# Patient Record
Sex: FEMALE | Race: BLACK OR AFRICAN AMERICAN | NOT HISPANIC OR LATINO
[De-identification: names, ages, dates, MRNs, and addresses within clinical notes are randomized per-mention and may not be internally consistent; named-entity substitution may affect disease eponyms.]

---

## 2017-01-04 PROBLEM — D17.1 LIPOMA OF ANTERIOR CHEST WALL: Status: ACTIVE | Noted: 2017-01-04

## 2017-03-22 PROBLEM — M23.301 DEGENERATIVE TEAR OF LATERAL MENISCUS OF LEFT KNEE: Status: ACTIVE | Noted: 2017-03-22

## 2018-02-20 ENCOUNTER — CHARTING TRANS (OUTPATIENT)
Dept: OTHER | Age: 53
End: 2018-02-20

## 2018-02-20 ENCOUNTER — LAB SERVICES (OUTPATIENT)
Dept: OTHER | Age: 53
End: 2018-02-20

## 2018-02-25 ENCOUNTER — CHARTING TRANS (OUTPATIENT)
Dept: OTHER | Age: 53
End: 2018-02-25

## 2018-02-27 LAB — HPV I/H RISK 4 DNA CVX QL NAA+PROBE: NORMAL

## 2018-04-16 ENCOUNTER — APPOINTMENT (OUTPATIENT)
Dept: LAB | Age: 53
End: 2018-04-16
Attending: FAMILY MEDICINE
Payer: COMMERCIAL

## 2018-04-16 ENCOUNTER — TELEPHONE (OUTPATIENT)
Dept: OTHER | Age: 53
End: 2018-04-16

## 2018-04-16 ENCOUNTER — OFFICE VISIT (OUTPATIENT)
Dept: FAMILY MEDICINE CLINIC | Facility: CLINIC | Age: 53
End: 2018-04-16

## 2018-04-16 ENCOUNTER — LAB ENCOUNTER (OUTPATIENT)
Dept: LAB | Age: 53
End: 2018-04-16
Attending: FAMILY MEDICINE
Payer: COMMERCIAL

## 2018-04-16 ENCOUNTER — HOSPITAL ENCOUNTER (OUTPATIENT)
Dept: GENERAL RADIOLOGY | Age: 53
Discharge: HOME | End: 2018-04-16
Attending: FAMILY MEDICINE
Payer: COMMERCIAL

## 2018-04-16 VITALS
HEART RATE: 98 BPM | WEIGHT: 166.63 LBS | TEMPERATURE: 98 F | RESPIRATION RATE: 16 BRPM | BODY MASS INDEX: 29.16 KG/M2 | SYSTOLIC BLOOD PRESSURE: 129 MMHG | DIASTOLIC BLOOD PRESSURE: 82 MMHG | HEIGHT: 63.5 IN

## 2018-04-16 DIAGNOSIS — R05.9 COUGH: ICD-10-CM

## 2018-04-16 DIAGNOSIS — R53.83 LETHARGY: ICD-10-CM

## 2018-04-16 DIAGNOSIS — R06.00 DYSPNEA, UNSPECIFIED TYPE: ICD-10-CM

## 2018-04-16 DIAGNOSIS — R94.31 SHORTENED PR INTERVAL: ICD-10-CM

## 2018-04-16 DIAGNOSIS — R53.83 LETHARGY: Primary | ICD-10-CM

## 2018-04-16 DIAGNOSIS — R94.31 ABNORMAL EKG: Primary | ICD-10-CM

## 2018-04-16 PROCEDURE — 99204 OFFICE O/P NEW MOD 45 MIN: CPT | Performed by: FAMILY MEDICINE

## 2018-04-16 PROCEDURE — 99212 OFFICE O/P EST SF 10 MIN: CPT | Performed by: FAMILY MEDICINE

## 2018-04-16 PROCEDURE — 36415 COLL VENOUS BLD VENIPUNCTURE: CPT

## 2018-04-16 PROCEDURE — 93005 ELECTROCARDIOGRAM TRACING: CPT

## 2018-04-16 PROCEDURE — 85025 COMPLETE CBC W/AUTO DIFF WBC: CPT

## 2018-04-16 PROCEDURE — 71046 X-RAY EXAM CHEST 2 VIEWS: CPT | Performed by: FAMILY MEDICINE

## 2018-04-16 PROCEDURE — 93010 ELECTROCARDIOGRAM REPORT: CPT | Performed by: FAMILY MEDICINE

## 2018-04-16 NOTE — TELEPHONE ENCOUNTER
Pt called notified results normal.   Notes recorded by Parminder Key on 4/16/2018 at 5:01 PM CDT  Lab letter mailed to patient  ------    Notes recorded by Taran Alfonso DO on 4/16/2018 at 4:12 PM CDT  Let her know her chest x-ray is normal.  No pneum

## 2018-04-16 NOTE — TELEPHONE ENCOUNTER
Notes recorded by Dave Andrews DO on 4/16/2018 at 5:53 PM CDT  Let her know her white blood cell count was slightly elevated along with her neutrophils.  I do not know if this means inflammation or some type of infection.  Her chest x-ray was completely

## 2018-04-16 NOTE — TELEPHONE ENCOUNTER
pt was inform of your message below. Pt stated that she feels fine. She does not feel she needs to go to ER. Pt stated that after the appt she went shopping and did not have SOB nor does she feel clammy.  pt will like for you to call her.  Thanks

## 2018-04-16 NOTE — PROGRESS NOTES
Patient ID: Jacob Cooper is a 48year old female. HPI  Patient presents with:  Cough  Fatigue    She states this started slightly on 4/12/2018 but on 4/13/2018 she really started feeling more fatigued.   She states even going up the stairs caused h Medical History:   Diagnosis Date   • Abnormal Pap smear of cervix     per [employer]:  \"Abnormal pap smear\"   • Lipid screening 10-    per [employer]       Past Surgical History:  No date: CHOLECYSTECTOMY       No current outpatient prescriptions on fi looks quite good but I am worried about her complaints. I do not think this is just a bronchitis as she has not coughed once in the office and her lungs sound great. Let us go and do some lab work but  also an EKG and a chest x-ray.   She does not feel si

## 2018-04-17 RX ORDER — BENZONATATE 200 MG/1
200 CAPSULE ORAL 3 TIMES DAILY PRN
Qty: 30 CAPSULE | Refills: 0 | Status: SHIPPED | OUTPATIENT
Start: 2018-04-17 | End: 2018-04-19

## 2018-04-17 RX ORDER — AZELASTINE 1 MG/ML
2 SPRAY, METERED NASAL 2 TIMES DAILY
Qty: 1 BOTTLE | Refills: 0 | Status: SHIPPED | OUTPATIENT
Start: 2018-04-17 | End: 2018-04-19

## 2018-04-17 NOTE — TELEPHONE ENCOUNTER
Pt informed of VS' recommendations and prescriptions sent as stated below. Pt satad is actually feeling better today so states she thinks she will just give it a day or two without the prescriptions and then try them if does not continue to clear up.   Also

## 2018-04-17 NOTE — TELEPHONE ENCOUNTER
Let her know her chest x-ray was clear without bronchitis or pneumonia but this is most likely a virus coming from her head going down the back of her throat. We have been seeing quite a bit of this. She can start Astelin nasal spray.   Let her know she d

## 2018-04-17 NOTE — TELEPHONE ENCOUNTER
Dr Haroon Hood, please advise. Advised patient on Dr. Hillary Campbell information and recommendations. Patient verbalized understanding. Given Cardiology phone # and she will call cardiology.  She asked about her respiratory symptoms, what she has, especially as she

## 2018-04-17 NOTE — TELEPHONE ENCOUNTER
Let her know that I called her on her preferred phone number but it went directly to [place]. I am glad that she is feeling better.   If she actually went shopping and is not really having any complaints and I do not see that she needs to go to the emerg

## 2018-04-18 ENCOUNTER — TELEPHONE (OUTPATIENT)
Dept: OTHER | Age: 53
End: 2018-04-18

## 2018-04-18 NOTE — TELEPHONE ENCOUNTER
Patient calling and states that she is just FU of rx's were sent to her pharmacy, advised that it was sent yesterday, verbalized understanding   Medication Detail      Disp Refills Start End    Azelastine HCl 0.1 % Nasal Solution 1 Bottle 0 4/17/2018

## 2018-04-19 RX ORDER — AZELASTINE 1 MG/ML
2 SPRAY, METERED NASAL 2 TIMES DAILY
Qty: 1 BOTTLE | Refills: 0 | Status: SHIPPED | OUTPATIENT
Start: 2018-04-19 | End: 2018-12-21

## 2018-04-19 RX ORDER — BENZONATATE 200 MG/1
200 CAPSULE ORAL 3 TIMES DAILY PRN
Qty: 30 CAPSULE | Refills: 0 | Status: SHIPPED | OUTPATIENT
Start: 2018-04-19 | End: 2018-04-29

## 2018-04-19 NOTE — TELEPHONE ENCOUNTER
Pt called in to check to see where rx's were sent. Pt states that they were supposed to be sent to the [facility] in [place] (updated on encounter). Please advise.

## 2018-08-28 ENCOUNTER — TELEPHONE (OUTPATIENT)
Dept: FAMILY MEDICINE CLINIC | Facility: CLINIC | Age: 53
End: 2018-08-28

## 2018-08-28 NOTE — TELEPHONE ENCOUNTER
We usually order these tests through a physical exam.  She has not had a physical exam with us since 2013. Please set up for a physical exam we will get the preventative testing done.

## 2018-08-28 NOTE — TELEPHONE ENCOUNTER
PT stts with her previous pcp she gets [facility] yearly  Pt stts she is over due for her MAMMO since FEB  Pt asking to have an order/REFERRAL  for her yearly BI-Lateral   Please advise

## 2018-09-10 ENCOUNTER — LAB ENCOUNTER (OUTPATIENT)
Dept: LAB | Age: 53
End: 2018-09-10
Attending: FAMILY MEDICINE
Payer: COMMERCIAL

## 2018-09-10 ENCOUNTER — OFFICE VISIT (OUTPATIENT)
Dept: FAMILY MEDICINE CLINIC | Facility: CLINIC | Age: 53
End: 2018-09-10

## 2018-09-10 VITALS
WEIGHT: 163 LBS | HEART RATE: 82 BPM | SYSTOLIC BLOOD PRESSURE: 121 MMHG | HEIGHT: 63.5 IN | BODY MASS INDEX: 28.53 KG/M2 | TEMPERATURE: 98 F | DIASTOLIC BLOOD PRESSURE: 80 MMHG

## 2018-09-10 DIAGNOSIS — L91.8 SKIN TAG: ICD-10-CM

## 2018-09-10 DIAGNOSIS — Z00.00 ADULT GENERAL MEDICAL EXAM: Primary | ICD-10-CM

## 2018-09-10 DIAGNOSIS — L57.8 SUN-DAMAGED SKIN: ICD-10-CM

## 2018-09-10 DIAGNOSIS — Z23 NEED FOR VACCINATION: ICD-10-CM

## 2018-09-10 DIAGNOSIS — Z12.4 CERVICAL CANCER SCREENING: ICD-10-CM

## 2018-09-10 DIAGNOSIS — Z12.31 VISIT FOR SCREENING MAMMOGRAM: ICD-10-CM

## 2018-09-10 DIAGNOSIS — Z12.11 SCREENING FOR COLON CANCER: ICD-10-CM

## 2018-09-10 DIAGNOSIS — L84 CALLUS OF FOOT: ICD-10-CM

## 2018-09-10 DIAGNOSIS — Z00.00 ADULT GENERAL MEDICAL EXAM: ICD-10-CM

## 2018-09-10 LAB
ALBUMIN SERPL BCP-MCNC: 4.2 G/DL (ref 3.5–4.8)
ALBUMIN/GLOB SERPL: 1.6 {RATIO} (ref 1–2)
ALP SERPL-CCNC: 55 U/L (ref 32–100)
ALT SERPL-CCNC: 23 U/L (ref 14–54)
ANION GAP SERPL CALC-SCNC: 6 MMOL/L (ref 0–18)
AST SERPL-CCNC: 20 U/L (ref 15–41)
BASOPHILS # BLD: 0.1 K/UL (ref 0–0.2)
BASOPHILS NFR BLD: 1 %
BILIRUB SERPL-MCNC: 1.3 MG/DL (ref 0.3–1.2)
BUN SERPL-MCNC: 9 MG/DL (ref 8–20)
BUN/CREAT SERPL: 13.2 (ref 10–20)
CALCIUM SERPL-MCNC: 9.3 MG/DL (ref 8.5–10.5)
CHLORIDE SERPL-SCNC: 104 MMOL/L (ref 95–110)
CHOLEST SERPL-MCNC: 248 MG/DL (ref 110–200)
CO2 SERPL-SCNC: 28 MMOL/L (ref 22–32)
CREAT SERPL-MCNC: 0.68 MG/DL (ref 0.5–1.5)
EOSINOPHIL # BLD: 0.2 K/UL (ref 0–0.7)
EOSINOPHIL NFR BLD: 2 %
ERYTHROCYTE [DISTWIDTH] IN BLOOD BY AUTOMATED COUNT: 12.6 % (ref 11–15)
GLOBULIN PLAS-MCNC: 2.7 G/DL (ref 2.5–3.7)
GLUCOSE SERPL-MCNC: 105 MG/DL (ref 70–99)
HCT VFR BLD AUTO: 41.1 % (ref 35–48)
HDLC SERPL-MCNC: 42 MG/DL
HGB BLD-MCNC: 13.8 G/DL (ref 12–16)
LDLC SERPL CALC-MCNC: 173 MG/DL (ref 0–99)
LYMPHOCYTES # BLD: 1.9 K/UL (ref 1–4)
LYMPHOCYTES NFR BLD: 30 %
MCH RBC QN AUTO: 32 PG (ref 27–32)
MCHC RBC AUTO-ENTMCNC: 33.7 G/DL (ref 32–37)
MCV RBC AUTO: 95 FL (ref 80–100)
MONOCYTES # BLD: 0.5 K/UL (ref 0–1)
MONOCYTES NFR BLD: 8 %
NEUTROPHILS # BLD AUTO: 3.8 K/UL (ref 1.8–7.7)
NEUTROPHILS NFR BLD: 59 %
NONHDLC SERPL-MCNC: 206 MG/DL
OSMOLALITY UR CALC.SUM OF ELEC: 285 MOSM/KG (ref 275–295)
PATIENT FASTING: YES
PLATELET # BLD AUTO: 340 K/UL (ref 140–400)
PMV BLD AUTO: 8.5 FL (ref 7.4–10.3)
POTASSIUM SERPL-SCNC: 4.5 MMOL/L (ref 3.3–5.1)
PROT SERPL-MCNC: 6.9 G/DL (ref 5.9–8.4)
RBC # BLD AUTO: 4.33 M/UL (ref 3.7–5.4)
SODIUM SERPL-SCNC: 138 MMOL/L (ref 136–144)
TRIGL SERPL-MCNC: 164 MG/DL (ref 1–149)
TSH SERPL-ACNC: 0.59 UIU/ML (ref 0.45–5.33)
WBC # BLD AUTO: 6.4 K/UL (ref 4–11)

## 2018-09-10 PROCEDURE — 82306 VITAMIN D 25 HYDROXY: CPT

## 2018-09-10 PROCEDURE — 84443 ASSAY THYROID STIM HORMONE: CPT

## 2018-09-10 PROCEDURE — 90471 IMMUNIZATION ADMIN: CPT | Performed by: FAMILY MEDICINE

## 2018-09-10 PROCEDURE — 36415 COLL VENOUS BLD VENIPUNCTURE: CPT

## 2018-09-10 PROCEDURE — 85025 COMPLETE CBC W/AUTO DIFF WBC: CPT

## 2018-09-10 PROCEDURE — 80053 COMPREHEN METABOLIC PANEL: CPT

## 2018-09-10 PROCEDURE — 80061 LIPID PANEL: CPT

## 2018-09-10 PROCEDURE — 99396 PREV VISIT EST AGE 40-64: CPT | Performed by: FAMILY MEDICINE

## 2018-09-10 PROCEDURE — 90715 TDAP VACCINE 7 YRS/> IM: CPT | Performed by: FAMILY MEDICINE

## 2018-09-10 NOTE — PROGRESS NOTES
Patient ID: Apurva Hsu is a 48year old female. HPI  Patient presents with:  Physical      She had a Pap smear in March of this year but she would like a gynecologist that is at our [address] [place] [address] office. She has had a mammogram but she is due.   She has 25 10/18/2013       Lab Results   Component Value Date    GLU 96 10/18/2013    BUN 10 10/18/2013    CREATSERUM 0.62 10/18/2013    BUNCREA 16.1 10/18/2013    ANIONGAP 10 10/18/2013    GFRAA > 60 10/18/2013    GFRNAA > 60 10/18/2013    CA 9.1 10/18/2013    N 118/80        Review of Systems   Constitutional: Negative for fatigue, fever and unexpected weight change. HENT: Negative for facial swelling and trouble swallowing. Eyes: Negative for visual disturbance.    Respiratory: Negative for cough and shortne Other Topics      Concerns:         Service: Not Asked        Blood Transfusions: Not Asked        Caffeine Concern: Yes          Coffee        Occupational Exposure: Not Asked        Hobby Hazards: Not Asked        Sleep Concern: Not Asked lateral foot at the fifth metatarsophalangeal joint she does have a callus the size of a dime. No Pain. Psychiatric: She has a normal mood and affect   Lower legs: No edema of the legs bilaterally. Vitals reviewed.     Blood pressure 121/80, pulse 82, if you are candidate to get the colonoscopy done at the hospital without having to see a physician first.          Referral Priority:Routine          Referral Type:Procedure          Number of Visits Requested:1        Follow up if symptoms persist.  Take

## 2018-09-12 LAB — 25(OH)D3 SERPL-MCNC: 37.4 NG/ML

## 2018-09-18 ENCOUNTER — HOSPITAL ENCOUNTER (OUTPATIENT)
Dept: MAMMOGRAPHY | Age: 53
Discharge: HOME | End: 2018-09-18
Attending: FAMILY MEDICINE
Payer: COMMERCIAL

## 2018-09-18 DIAGNOSIS — Z12.31 VISIT FOR SCREENING MAMMOGRAM: ICD-10-CM

## 2018-09-18 PROCEDURE — 77067 SCR MAMMO BI INCL CAD: CPT | Performed by: FAMILY MEDICINE

## 2018-10-03 PROCEDURE — 81003 URINALYSIS AUTO W/O SCOPE: CPT | Performed by: INTERNAL MEDICINE

## 2018-10-24 ENCOUNTER — OFFICE VISIT (OUTPATIENT)
Dept: DERMATOLOGY CLINIC | Facility: CLINIC | Age: 53
End: 2018-10-24

## 2018-10-24 DIAGNOSIS — D23.4 BENIGN NEOPLASM OF SCALP AND SKIN OF NECK: ICD-10-CM

## 2018-10-24 DIAGNOSIS — L82.1 SEBORRHEIC KERATOSES: ICD-10-CM

## 2018-10-24 DIAGNOSIS — D23.30 BENIGN NEOPLASM OF SKIN OF FACE: ICD-10-CM

## 2018-10-24 DIAGNOSIS — D23.5 BENIGN NEOPLASM OF SKIN OF TRUNK, EXCEPT SCROTUM: ICD-10-CM

## 2018-10-24 DIAGNOSIS — D22.9 MULTIPLE NEVI: Primary | ICD-10-CM

## 2018-10-24 DIAGNOSIS — D23.70 BENIGN NEOPLASM OF SKIN OF LOWER LIMB, INCLUDING HIP, UNSPECIFIED LATERALITY: ICD-10-CM

## 2018-10-24 DIAGNOSIS — D23.60 BENIGN NEOPLASM OF SKIN OF UPPER LIMB, INCLUDING SHOULDER, UNSPECIFIED LATERALITY: ICD-10-CM

## 2018-10-24 PROCEDURE — 99203 OFFICE O/P NEW LOW 30 MIN: CPT | Performed by: DERMATOLOGY

## 2018-10-24 PROCEDURE — 99212 OFFICE O/P EST SF 10 MIN: CPT | Performed by: DERMATOLOGY

## 2018-10-24 NOTE — PROGRESS NOTES
Past Medical History:   Diagnosis Date   • Abnormal Pap smear of cervix     per [employer]:  \"Abnormal pap smear\"   • Lipid screening 10-    per [employer]     Past Surgical History:   Procedure Laterality Date   • CHOLECYSTECTOMY       Social History

## 2018-11-01 VITALS
DIASTOLIC BLOOD PRESSURE: 70 MMHG | SYSTOLIC BLOOD PRESSURE: 120 MMHG | HEIGHT: 65 IN | WEIGHT: 169 LBS | BODY MASS INDEX: 28.16 KG/M2

## 2018-11-04 NOTE — PROGRESS NOTES
Asberry Merlin is a 48year old female. HPI:     CC:  Patient presents with:  Full Skin Exam: New pt. Pt presents for a full skin exam. Pt denies personal and family hx of skin cancer. Allergies:  Patient has no known allergies.     HISTORY: Topics      Concerns:         Service: Not Asked        Blood Transfusions: Not Asked        Caffeine Concern: Yes          Coffee        Occupational Exposure: Not Asked        Hobby Hazards: Not Asked        Sleep Concern: Not Asked        Stress papules scattered. See map today's date for lesions noted . Otherwise remarkable for lesions as noted on map.   See details of examination  See Assessment /Plan for additional history and physical exam also:    Assessment / plan:    No orders of the noted in follow-up/ above. This note was generated using Dragon voice recognition software. Please contact me regarding any confusion resulting from errors in recognition.

## 2018-12-07 ENCOUNTER — TELEPHONE (OUTPATIENT)
Dept: FAMILY MEDICINE CLINIC | Facility: CLINIC | Age: 53
End: 2018-12-07

## 2018-12-07 DIAGNOSIS — M21.619 BUNION OF UNSPECIFIED FOOT: Primary | ICD-10-CM

## 2018-12-07 NOTE — TELEPHONE ENCOUNTER
Dr. Alison Swift     Patient called requesting a referral for Podiatry dept. Per patient is have pain on left foot possible bunion. Please advise and sign off on referral if you agree.       Thank you, Dot Blanca Small-Referral Specialist.

## 2018-12-21 ENCOUNTER — OFFICE VISIT (OUTPATIENT)
Dept: PODIATRY CLINIC | Facility: CLINIC | Age: 53
End: 2018-12-21

## 2018-12-21 DIAGNOSIS — L85.1 PLANTAR POROKERATOSIS, ACQUIRED: ICD-10-CM

## 2018-12-21 DIAGNOSIS — M77.42 METATARSALGIA OF LEFT FOOT: ICD-10-CM

## 2018-12-21 DIAGNOSIS — M79.672 PAIN IN LEFT FOOT: ICD-10-CM

## 2018-12-21 DIAGNOSIS — G57.61 NEUROMA OF SECOND INTERSPACE OF RIGHT FOOT: ICD-10-CM

## 2018-12-21 DIAGNOSIS — L84 CALLUS OF FOOT: Primary | ICD-10-CM

## 2018-12-21 PROCEDURE — 99203 OFFICE O/P NEW LOW 30 MIN: CPT | Performed by: PODIATRIST

## 2018-12-26 NOTE — PROGRESS NOTES
Tiara Ron is a 48year old female. Patient presents with:  Consult: left foot lesion -- Onset few mths. Pain level is 5-6/10 and comes and goes.          HPI:   This pleasant patient presents to the clinic with a chief complaint of a painful callus no apparent distress  EXTREMITIES:   1. Integument: The skin on both feet was examined it is warm and dry. Nails have relatively normal thickness.   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

## 2019-06-03 ENCOUNTER — TELEPHONE (OUTPATIENT)
Dept: CASE MANAGEMENT | Age: 54
End: 2019-06-03

## 2019-07-31 ENCOUNTER — TELEPHONE (OUTPATIENT)
Dept: CASE MANAGEMENT | Age: 54
End: 2019-07-31

## 2019-11-11 ENCOUNTER — TELEPHONE (OUTPATIENT)
Dept: CASE MANAGEMENT | Age: 54
End: 2019-11-11

## 2019-11-22 ENCOUNTER — OFFICE VISIT (OUTPATIENT)
Dept: FAMILY MEDICINE CLINIC | Facility: CLINIC | Age: 54
End: 2019-11-22

## 2019-11-22 VITALS
SYSTOLIC BLOOD PRESSURE: 125 MMHG | HEIGHT: 63.5 IN | BODY MASS INDEX: 30.1 KG/M2 | HEART RATE: 88 BPM | WEIGHT: 172 LBS | DIASTOLIC BLOOD PRESSURE: 75 MMHG | TEMPERATURE: 98 F

## 2019-11-22 DIAGNOSIS — R73.9 HYPERGLYCEMIA: ICD-10-CM

## 2019-11-22 DIAGNOSIS — Z12.4 CERVICAL CANCER SCREENING: ICD-10-CM

## 2019-11-22 DIAGNOSIS — Z12.11 SCREENING FOR COLON CANCER: ICD-10-CM

## 2019-11-22 DIAGNOSIS — Z28.21 IMMUNIZATION NOT CARRIED OUT BECAUSE OF PATIENT REFUSAL: ICD-10-CM

## 2019-11-22 DIAGNOSIS — Z12.31 VISIT FOR SCREENING MAMMOGRAM: ICD-10-CM

## 2019-11-22 DIAGNOSIS — Z00.00 ADULT GENERAL MEDICAL EXAM: Primary | ICD-10-CM

## 2019-11-22 PROCEDURE — 99396 PREV VISIT EST AGE 40-64: CPT | Performed by: FAMILY MEDICINE

## 2019-11-22 NOTE — PROGRESS NOTES
Patient ID: Jerel Men is a 47year old female. HPI  Patient presents with: Annual    Pt is  and does not smoke. 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 She also works at a [employer].  States life has been kind to 10/18/2013    EOS 0.1 10/18/2013    BASO 0.1 10/18/2013    NEPERCENT 59 09/10/2018    LYPERCENT 30 09/10/2018    MOPERCENT 8 09/10/2018    EOPERCENT 2 09/10/2018    BAPERCENT 1 09/10/2018    NE 3.8 09/10/2018    LYMABS 1.9 09/10/2018    MOABSO 0.5 09/10/20 27.20 kg/m²      BP Readings from Last 6 Encounters:  11/22/19 : 125/75  09/10/18 : 121/80  04/16/18 : 129/82  03/09/15 : 120/75        Review of Systems   Constitutional: Negative for chills and fever. HENT: Negative for voice change.     Respiratory: Ne Active member of club or organization: Not on file        Attends meetings of clubs or organizations: Not on file        Relationship status: Not on file      Intimate partner violence:        Fear of current or ex partner: Not on file        Emotionally a Lymphadenopathy: She has no cervical adenopathy. Lower legs: No edema of the legs bilaterally. Neurological: She is alert and oriented to person, place, and time. She has normal reflexes. No cranial nerve deficit. Skin: Skin is warm and dry.  No sloan Referral Priority:Routine          Referral Type:OFFICE VISIT          Referred to Provider:Bibiana Baca PA-C          Requested Specialty:GASTROENTEROLOGY          Number of Visits Requested:3        Jinny Antwan  11/22/2019      By signing my name

## 2019-12-02 ENCOUNTER — LAB ENCOUNTER (OUTPATIENT)
Dept: LAB | Age: 54
End: 2019-12-02
Attending: FAMILY MEDICINE
Payer: COMMERCIAL

## 2019-12-02 DIAGNOSIS — Z00.00 ADULT GENERAL MEDICAL EXAM: ICD-10-CM

## 2019-12-02 DIAGNOSIS — R73.9 HYPERGLYCEMIA: ICD-10-CM

## 2019-12-02 PROCEDURE — 83036 HEMOGLOBIN GLYCOSYLATED A1C: CPT

## 2019-12-02 PROCEDURE — 80061 LIPID PANEL: CPT

## 2019-12-02 PROCEDURE — 84443 ASSAY THYROID STIM HORMONE: CPT

## 2019-12-02 PROCEDURE — 85025 COMPLETE CBC W/AUTO DIFF WBC: CPT

## 2019-12-02 PROCEDURE — 80053 COMPREHEN METABOLIC PANEL: CPT

## 2019-12-02 PROCEDURE — 36415 COLL VENOUS BLD VENIPUNCTURE: CPT

## 2020-01-03 ENCOUNTER — TELEPHONE (OUTPATIENT)
Dept: FAMILY MEDICINE CLINIC | Facility: CLINIC | Age: 55
End: 2020-01-03

## 2020-01-03 ENCOUNTER — HOSPITAL ENCOUNTER (OUTPATIENT)
Dept: MAMMOGRAPHY | Age: 55
Discharge: HOME | End: 2020-01-03
Attending: FAMILY MEDICINE
Payer: COMMERCIAL

## 2020-01-03 DIAGNOSIS — Z12.31 VISIT FOR SCREENING MAMMOGRAM: ICD-10-CM

## 2020-01-03 PROCEDURE — 77063 BREAST TOMOSYNTHESIS BI: CPT | Performed by: FAMILY MEDICINE

## 2020-01-03 PROCEDURE — 77067 SCR MAMMO BI INCL CAD: CPT | Performed by: FAMILY MEDICINE

## 2020-02-07 ENCOUNTER — OFFICE VISIT (OUTPATIENT)
Dept: FAMILY MEDICINE CLINIC | Facility: CLINIC | Age: 55
End: 2020-02-07

## 2020-02-07 VITALS
HEART RATE: 84 BPM | BODY MASS INDEX: 30.1 KG/M2 | DIASTOLIC BLOOD PRESSURE: 71 MMHG | TEMPERATURE: 98 F | SYSTOLIC BLOOD PRESSURE: 118 MMHG | WEIGHT: 172 LBS | HEIGHT: 63.5 IN

## 2020-02-07 DIAGNOSIS — B00.9 HERPES SIMPLEX INFECTION: Primary | ICD-10-CM

## 2020-02-07 PROCEDURE — 99213 OFFICE O/P EST LOW 20 MIN: CPT | Performed by: FAMILY MEDICINE

## 2020-02-07 NOTE — PROGRESS NOTES
Patient ID: Eleanor Hull is a 47year old female. HPI  Patient presents with:  Rash: back, Started Sun     Pt noticed a rash on her back on Sunday 2/2/20. It is not very itchy unless she rubs it against something. It is not painful.  She has a single CHOLECYSTECTOMY            No current outpatient medications on file. Allergies:No Known Allergies   PHYSICAL EXAM:   Physical Exam   Physical Exam   Constitutional: Patient is oriented to person, place, and time.  Patient appears well-developed and wel

## 2020-02-19 ENCOUNTER — OFFICE VISIT (OUTPATIENT)
Dept: OBGYN CLINIC | Facility: CLINIC | Age: 55
End: 2020-02-19

## 2020-02-19 VITALS
SYSTOLIC BLOOD PRESSURE: 124 MMHG | BODY MASS INDEX: 30 KG/M2 | WEIGHT: 171.81 LBS | DIASTOLIC BLOOD PRESSURE: 85 MMHG | HEART RATE: 90 BPM

## 2020-02-19 DIAGNOSIS — Z12.4 CERVICAL CANCER SCREENING: ICD-10-CM

## 2020-02-19 DIAGNOSIS — Z01.419 ENCOUNTER FOR GYNECOLOGICAL EXAMINATION WITHOUT ABNORMAL FINDING: Primary | ICD-10-CM

## 2020-02-19 PROCEDURE — G0438 PPPS, INITIAL VISIT: HCPCS | Performed by: OBSTETRICS & GYNECOLOGY

## 2020-02-19 PROCEDURE — 99386 PREV VISIT NEW AGE 40-64: CPT | Performed by: OBSTETRICS & GYNECOLOGY

## 2020-02-19 NOTE — PROGRESS NOTES
Well Woman Exam    HPI:  The patient is a 48yo female who presents today for annual exam. She has no complaints. She has gone 5 months without a menses then had normal menses Dec and Jan. She does have night sweats and hot flushes but manageable.  No abnorm meetings of clubs or organizations: Not on file        Relationship status: Not on file      Intimate partner violence:        Fear of current or ex partner: Not on file        Emotionally abused: Not on file        Physically abused: Not on file        Fo adenopathy  Heart: Regular rate and rhythm   Lungs: clear to ascultation bilaterally   Lymphatic:no abnormal supraclavicular or axillary adenopathy is noted  Breast: normal without palpable masses, tenderness, asymmetry, nipple discharge, nipple retraction

## 2020-02-20 LAB — HPV I/H RISK 1 DNA SPEC QL NAA+PROBE: NEGATIVE

## 2020-07-23 ENCOUNTER — OFFICE VISIT (OUTPATIENT)
Dept: FAMILY MEDICINE CLINIC | Facility: CLINIC | Age: 55
End: 2020-07-23

## 2020-07-23 ENCOUNTER — HOSPITAL ENCOUNTER (OUTPATIENT)
Dept: GENERAL RADIOLOGY | Age: 55
Discharge: HOME | End: 2020-07-23
Attending: FAMILY MEDICINE
Payer: COMMERCIAL

## 2020-07-23 VITALS
HEIGHT: 63.5 IN | HEART RATE: 88 BPM | BODY MASS INDEX: 30.31 KG/M2 | DIASTOLIC BLOOD PRESSURE: 69 MMHG | WEIGHT: 173.19 LBS | SYSTOLIC BLOOD PRESSURE: 104 MMHG | TEMPERATURE: 98 F

## 2020-07-23 DIAGNOSIS — M25.511 CHRONIC RIGHT SHOULDER PAIN: Primary | ICD-10-CM

## 2020-07-23 DIAGNOSIS — M25.511 CHRONIC RIGHT SHOULDER PAIN: ICD-10-CM

## 2020-07-23 DIAGNOSIS — G89.29 CHRONIC RIGHT SHOULDER PAIN: Primary | ICD-10-CM

## 2020-07-23 DIAGNOSIS — M25.611 DECREASED RANGE OF MOTION OF RIGHT SHOULDER: ICD-10-CM

## 2020-07-23 DIAGNOSIS — Z12.11 SCREENING FOR COLON CANCER: ICD-10-CM

## 2020-07-23 DIAGNOSIS — G89.29 CHRONIC RIGHT SHOULDER PAIN: ICD-10-CM

## 2020-07-23 PROCEDURE — 3008F BODY MASS INDEX DOCD: CPT | Performed by: FAMILY MEDICINE

## 2020-07-23 PROCEDURE — 73030 X-RAY EXAM OF SHOULDER: CPT | Performed by: FAMILY MEDICINE

## 2020-07-23 PROCEDURE — 3078F DIAST BP <80 MM HG: CPT | Performed by: FAMILY MEDICINE

## 2020-07-23 PROCEDURE — 3074F SYST BP LT 130 MM HG: CPT | Performed by: FAMILY MEDICINE

## 2020-07-23 PROCEDURE — 99214 OFFICE O/P EST MOD 30 MIN: CPT | Performed by: FAMILY MEDICINE

## 2020-07-23 NOTE — PROGRESS NOTES
Patient ID: Baron Ochoa is a 54year old female. HPI  Patient presents with:  Shoulder Pain: right shoulder pain       Pt had Hx of left shoulder pain about 7 years ago.  She has been experiencing right shoulder pain for about 8 - 9 months and notic patient is not nervous/anxious.             Medical History:      Past Medical History:   Diagnosis Date   • Abnormal Pap smear of cervix     per [employer]:  \"Abnormal pap smear\"   • Lipid screening 10-    per NextGen       Past Surgical History:   P muscles to help her with internal rotation. I think physical therapy would be helpful. X-ray the right shoulder. Decreased range of motion of right shoulder  -     XR SHOULDER, COMPLETE (MIN 2 VIEWS), RIGHT (CPT=73030);  Future  -     PHYSICAL THERAPY -

## 2020-07-23 NOTE — PATIENT INSTRUCTIONS
If physical therapy makes her shoulder much better you do not need to see me. If you are not feeling much better after 6 to 8 weeks then please see me.   Otherwise see me after November 22, 2020 for your physical exam.

## 2020-08-12 ENCOUNTER — TELEPHONE (OUTPATIENT)
Dept: PHYSICAL THERAPY | Age: 55
End: 2020-08-12

## 2020-08-17 ENCOUNTER — OFFICE VISIT (OUTPATIENT)
Dept: PHYSICAL THERAPY | Age: 55
End: 2020-08-17
Attending: FAMILY MEDICINE
Payer: COMMERCIAL

## 2020-08-17 DIAGNOSIS — M25.611 DECREASED RANGE OF MOTION OF RIGHT SHOULDER: ICD-10-CM

## 2020-08-17 DIAGNOSIS — G89.29 CHRONIC RIGHT SHOULDER PAIN: ICD-10-CM

## 2020-08-17 DIAGNOSIS — M25.511 CHRONIC RIGHT SHOULDER PAIN: ICD-10-CM

## 2020-08-17 PROCEDURE — 97110 THERAPEUTIC EXERCISES: CPT | Performed by: PHYSICAL THERAPIST

## 2020-08-17 PROCEDURE — 97161 PT EVAL LOW COMPLEX 20 MIN: CPT | Performed by: PHYSICAL THERAPIST

## 2020-08-17 NOTE — PROGRESS NOTES
P.T. EVALUATION:   Referring Physician: Dr. Cornelio Boyer  Diagnosis: Chronic right shoulder pain (M25.511,G89.29)  Decreased range of motion of right shoulder (M25.611)    Date of Onset: 7/23/2020 Date of Service: 8/17/2020     PATIENT SUMMARY   Patient Efren Ortega rest of R UE. Special tests: (-) drop arm R shoulder, (-) impingement sign    Posture: WFL    Balance: WFL    Gait: WNL without a device.     Today’s Treatment and Response:  Patient education provided on PT eval findings, treatment plan, goals,  Patient

## 2020-08-20 ENCOUNTER — OFFICE VISIT (OUTPATIENT)
Dept: PHYSICAL THERAPY | Age: 55
End: 2020-08-20
Attending: FAMILY MEDICINE
Payer: COMMERCIAL

## 2020-08-20 DIAGNOSIS — M25.611 DECREASED RANGE OF MOTION OF RIGHT SHOULDER: ICD-10-CM

## 2020-08-20 DIAGNOSIS — G89.29 CHRONIC RIGHT SHOULDER PAIN: ICD-10-CM

## 2020-08-20 DIAGNOSIS — M25.511 CHRONIC RIGHT SHOULDER PAIN: ICD-10-CM

## 2020-08-20 PROCEDURE — 97110 THERAPEUTIC EXERCISES: CPT | Performed by: PHYSICAL THERAPIST

## 2020-08-20 NOTE — PROGRESS NOTES
Diagnosis: Chronic right shoulder pain (M25.511,G89.29)  Decreased range of motion of right shoulder (M25.611)        Next MD visit: none scheduled  Fall Risk: standard         Precautions: n/a          Medication Changes since last visit?: No      Fatimah Chute

## 2020-08-24 ENCOUNTER — APPOINTMENT (OUTPATIENT)
Dept: PHYSICAL THERAPY | Age: 55
End: 2020-08-24
Attending: FAMILY MEDICINE
Payer: COMMERCIAL

## 2020-08-27 ENCOUNTER — APPOINTMENT (OUTPATIENT)
Dept: PHYSICAL THERAPY | Age: 55
End: 2020-08-27
Attending: FAMILY MEDICINE
Payer: COMMERCIAL

## 2020-09-03 ENCOUNTER — OFFICE VISIT (OUTPATIENT)
Dept: PHYSICAL THERAPY | Age: 55
End: 2020-09-03
Attending: FAMILY MEDICINE
Payer: COMMERCIAL

## 2020-09-03 DIAGNOSIS — M25.611 DECREASED RANGE OF MOTION OF RIGHT SHOULDER: ICD-10-CM

## 2020-09-03 DIAGNOSIS — M25.511 CHRONIC RIGHT SHOULDER PAIN: ICD-10-CM

## 2020-09-03 DIAGNOSIS — G89.29 CHRONIC RIGHT SHOULDER PAIN: ICD-10-CM

## 2020-09-03 PROCEDURE — 97110 THERAPEUTIC EXERCISES: CPT | Performed by: PHYSICAL THERAPIST

## 2020-09-03 NOTE — PROGRESS NOTES
Diagnosis: Chronic right shoulder pain (M25.511,G89.29)  Decreased range of motion of right shoulder (M25.611)        Next MD visit: none scheduled  Fall Risk: standard         Precautions: n/a          Medication Changes since last visit?: No      Thu Pritchett 1/10 during activity. 3. Increase R shoulder ROM to WNL into all planes to improve mobility. 4. Increase R UE strength to 5/5 throughout to be able to resume previous activities without difficulty. Plan: Continue PT.       Charges: EX3

## 2020-09-10 ENCOUNTER — APPOINTMENT (OUTPATIENT)
Dept: PHYSICAL THERAPY | Age: 55
End: 2020-09-10
Attending: FAMILY MEDICINE
Payer: COMMERCIAL

## 2020-09-14 ENCOUNTER — OFFICE VISIT (OUTPATIENT)
Dept: PHYSICAL THERAPY | Age: 55
End: 2020-09-14
Attending: FAMILY MEDICINE
Payer: COMMERCIAL

## 2020-09-14 DIAGNOSIS — M25.611 DECREASED RANGE OF MOTION OF RIGHT SHOULDER: ICD-10-CM

## 2020-09-14 DIAGNOSIS — M25.511 CHRONIC RIGHT SHOULDER PAIN: ICD-10-CM

## 2020-09-14 DIAGNOSIS — G89.29 CHRONIC RIGHT SHOULDER PAIN: ICD-10-CM

## 2020-09-14 PROCEDURE — 97110 THERAPEUTIC EXERCISES: CPT | Performed by: PHYSICAL THERAPIST

## 2020-09-14 NOTE — PROGRESS NOTES
Diagnosis: Chronic right shoulder pain (M25.511,G89.29)  Decreased range of motion of right shoulder (M25.611)        Next MD visit: none scheduled  Fall Risk: standard         Precautions: n/a          Medication Changes since last visit?: No      Ama Shah x 2  - sidelying R shoulder scaption painfree range only 10 x 2  - behind the back reach 10x  - B shoulder rows, extensions with green theraband 10 x 2  -reviewed HEP.  Instructed on additional HEP with green theraband 10 x 2                             Ass

## 2020-09-17 ENCOUNTER — OFFICE VISIT (OUTPATIENT)
Dept: PHYSICAL THERAPY | Age: 55
End: 2020-09-17
Attending: FAMILY MEDICINE
Payer: COMMERCIAL

## 2020-09-17 DIAGNOSIS — M25.611 DECREASED RANGE OF MOTION OF RIGHT SHOULDER: ICD-10-CM

## 2020-09-17 DIAGNOSIS — M25.511 CHRONIC RIGHT SHOULDER PAIN: ICD-10-CM

## 2020-09-17 DIAGNOSIS — G89.29 CHRONIC RIGHT SHOULDER PAIN: ICD-10-CM

## 2020-09-17 PROCEDURE — 97110 THERAPEUTIC EXERCISES: CPT | Performed by: PHYSICAL THERAPIST

## 2020-09-17 NOTE — PROGRESS NOTES
Diagnosis: Chronic right shoulder pain (M25.511,G89.29)  Decreased range of motion of right shoulder (M25.611)        Next MD visit: none scheduled  Fall Risk: standard         Precautions: n/a          Medication Changes since last visit?: Larisa Farmer 2  - sidelying R shoulder scaption with 1lb wt 10 x 2  - prone R shoulder single arm row, extensions with 2lb wt 10 x 2  - R shoulder single arm row 20# 10 x 2  - R shoulder single extensions row 20# 10 x 2  - R shoulder internal and external rotation with

## 2020-09-21 ENCOUNTER — OFFICE VISIT (OUTPATIENT)
Dept: PHYSICAL THERAPY | Age: 55
End: 2020-09-21
Attending: FAMILY MEDICINE
Payer: COMMERCIAL

## 2020-09-21 DIAGNOSIS — G89.29 CHRONIC RIGHT SHOULDER PAIN: ICD-10-CM

## 2020-09-21 DIAGNOSIS — M25.511 CHRONIC RIGHT SHOULDER PAIN: ICD-10-CM

## 2020-09-21 DIAGNOSIS — M25.611 DECREASED RANGE OF MOTION OF RIGHT SHOULDER: ICD-10-CM

## 2020-09-21 PROCEDURE — 97110 THERAPEUTIC EXERCISES: CPT | Performed by: PHYSICAL THERAPIST

## 2020-09-21 NOTE — PROGRESS NOTES
Diagnosis: Chronic right shoulder pain (M25.511,G89.29)  Decreased range of motion of right shoulder (M25.611)        Next MD visit: none scheduled  Fall Risk: standard         Precautions: n/a          Medication Changes since last visit?: Larisa Coley 2  - R shoulder single cable extensions 10# 10 x 2  - R shoulder single external and internal rotation 0# 10 x 2                         Assessment: Continued progression of therapeutic exercises. Patient and PT goals are in progress.     Goals     • Therap

## 2020-09-24 ENCOUNTER — APPOINTMENT (OUTPATIENT)
Dept: PHYSICAL THERAPY | Age: 55
End: 2020-09-24
Attending: FAMILY MEDICINE
Payer: COMMERCIAL

## 2020-09-28 ENCOUNTER — TELEPHONE (OUTPATIENT)
Dept: PHYSICAL THERAPY | Age: 55
End: 2020-09-28

## 2020-10-01 ENCOUNTER — APPOINTMENT (OUTPATIENT)
Dept: PHYSICAL THERAPY | Age: 55
End: 2020-10-01
Attending: FAMILY MEDICINE
Payer: COMMERCIAL

## 2020-10-08 ENCOUNTER — APPOINTMENT (OUTPATIENT)
Dept: PHYSICAL THERAPY | Age: 55
End: 2020-10-08
Attending: FAMILY MEDICINE
Payer: COMMERCIAL

## 2020-10-27 ENCOUNTER — APPOINTMENT (OUTPATIENT)
Dept: PHYSICAL THERAPY | Age: 55
End: 2020-10-27
Attending: FAMILY MEDICINE
Payer: COMMERCIAL

## 2020-10-30 ENCOUNTER — TELEPHONE (OUTPATIENT)
Dept: GASTROENTEROLOGY | Facility: CLINIC | Age: 55
End: 2020-10-30

## 2020-10-30 ENCOUNTER — OFFICE VISIT (OUTPATIENT)
Dept: GASTROENTEROLOGY | Facility: CLINIC | Age: 55
End: 2020-10-30

## 2020-10-30 VITALS
TEMPERATURE: 98 F | WEIGHT: 169.81 LBS | DIASTOLIC BLOOD PRESSURE: 71 MMHG | HEART RATE: 99 BPM | SYSTOLIC BLOOD PRESSURE: 112 MMHG | HEIGHT: 63.5 IN | BODY MASS INDEX: 29.71 KG/M2

## 2020-10-30 DIAGNOSIS — Z12.11 ENCOUNTER FOR SCREENING COLONOSCOPY: Primary | ICD-10-CM

## 2020-10-30 PROCEDURE — 3008F BODY MASS INDEX DOCD: CPT | Performed by: INTERNAL MEDICINE

## 2020-10-30 PROCEDURE — 3078F DIAST BP <80 MM HG: CPT | Performed by: INTERNAL MEDICINE

## 2020-10-30 PROCEDURE — 99242 OFF/OP CONSLTJ NEW/EST SF 20: CPT | Performed by: INTERNAL MEDICINE

## 2020-10-30 PROCEDURE — 3074F SYST BP LT 130 MM HG: CPT | Performed by: INTERNAL MEDICINE

## 2020-10-30 NOTE — TELEPHONE ENCOUNTER
GI RNs -  I saw Ms. Johnson for a colon cancer screening colonoscopy office visit on 10/30/2020. We have decided to defer/postpone the exam due to the COVID-19 pandemic and current surge.     Please recall for chart check and telephone call on/after 1/25

## 2020-10-30 NOTE — PROGRESS NOTES
HPI:    Patient ID: Aura Zuluaga is a 54year old fit and healthy woman, BMI 29.6 who walks her dog a good 3 miles every morning to see the sunrise. Sounds strong and healthy.     Ms. Liss Walton presents today to discuss screening colonoscopy examination family history colorectal cancer. Sounds like her parents are up-to-date on her health care and screening. Never smoker. No previous colonoscopy or stomach endoscopy examination.     Suggest:    We discussed the nature and risks of screening colonosc

## 2020-10-30 NOTE — TELEPHONE ENCOUNTER
Recall entered into pt outreach to call the pt on 01/25/2021 to see if she is ready to schedule her colonoscopy

## 2020-12-21 ENCOUNTER — TELEPHONE (OUTPATIENT)
Dept: FAMILY MEDICINE CLINIC | Facility: CLINIC | Age: 55
End: 2020-12-21

## 2020-12-21 DIAGNOSIS — G89.29 CHRONIC RIGHT SHOULDER PAIN: Primary | ICD-10-CM

## 2020-12-21 DIAGNOSIS — M25.511 CHRONIC RIGHT SHOULDER PAIN: Primary | ICD-10-CM

## 2021-01-15 ENCOUNTER — TELEPHONE (OUTPATIENT)
Dept: PHYSICAL THERAPY | Facility: HOSPITAL | Age: 56
End: 2021-01-15

## 2021-01-15 ENCOUNTER — TELEPHONE (OUTPATIENT)
Dept: FAMILY MEDICINE CLINIC | Facility: CLINIC | Age: 56
End: 2021-01-15

## 2021-01-15 DIAGNOSIS — M25.511 CHRONIC RIGHT SHOULDER PAIN: Primary | ICD-10-CM

## 2021-01-15 DIAGNOSIS — G89.29 CHRONIC RIGHT SHOULDER PAIN: Primary | ICD-10-CM

## 2021-01-15 NOTE — TELEPHONE ENCOUNTER
Per patient she still needs referral for Physical Therapy for follow up about her shoulder  and her appointment is on 02/12/2021.

## 2021-01-15 NOTE — TELEPHONE ENCOUNTER
Referral # [id number] authorization has . Patient never used referral, insurance has changed. New order generated.  Managed care, please assist.

## 2021-01-25 ENCOUNTER — TELEPHONE (OUTPATIENT)
Dept: GASTROENTEROLOGY | Facility: CLINIC | Age: 56
End: 2021-01-25

## 2021-01-25 DIAGNOSIS — Z12.11 COLON CANCER SCREENING: Primary | ICD-10-CM

## 2021-01-25 NOTE — TELEPHONE ENCOUNTER
Per OV note 10/30/2020   Suggest:     We discussed the nature and risks of screening colonoscopy examination for colon cancer prevention.   We discussed screening colonoscopy examination to find and remove precancerous colon polyps, beginning at age 48 in t

## 2021-01-25 NOTE — TELEPHONE ENCOUNTER
Patient outreach message received. Please see message below.     From TE on 10/30/2020:  \"Recall entered into pt outreach to call the pt on 01/25/2021 to see if she is ready to schedule her colonoscopy\"

## 2021-01-26 RX ORDER — SODIUM, POTASSIUM,MAG SULFATES 17.5-3.13G
SOLUTION, RECONSTITUTED, ORAL ORAL
Qty: 1 BOTTLE | Refills: 0 | Status: SHIPPED | OUTPATIENT
Start: 2021-01-26 | End: 2021-09-16

## 2021-01-26 NOTE — TELEPHONE ENCOUNTER
GREAT NEWS, thanks Sussy Stoddard and Yolis Peñaloza for following up.       GI schedulers –    Please schedule colonoscopy exam at [facility] (Vineet Quezada)    BMI Readings from Last 1 Encounters:  10/30/20 : 29.61 kg/m²     MAC anesthesia     Suprep small

## 2021-01-26 NOTE — TELEPHONE ENCOUNTER
Dr Ofelia Oakley,    I spoke to the pt and she is ready to schedule her colonoscopy   No changes to her health status, allergies and currently not on any medications. Pharmacy verified.      Please advise on orders

## 2021-02-03 NOTE — TELEPHONE ENCOUNTER
Case still pending review with Mikhail Senior, sent a message to expedite. Spoke with patient and advised of status. Patient indicated her appt is scheduled for 02/12/2021.

## 2021-02-05 ENCOUNTER — TELEPHONE (OUTPATIENT)
Dept: PHYSICAL THERAPY | Facility: HOSPITAL | Age: 56
End: 2021-02-05

## 2021-02-10 NOTE — TELEPHONE ENCOUNTER
Per Rafael Rodney, patient is not with [facility] site. Unable to process referral. Patient was informed of this matter, will reach out to the patient once again.

## 2021-02-10 NOTE — TELEPHONE ENCOUNTER
Its been 3.5 weeks since this referral was ordered. Can you please provide status in this referral. If there is an issue can this be addressed.  Patient has an appointment in 2 days

## 2021-02-12 ENCOUNTER — APPOINTMENT (OUTPATIENT)
Dept: PHYSICAL THERAPY | Age: 56
End: 2021-02-12
Attending: NURSE PRACTITIONER
Payer: COMMERCIAL

## 2021-02-15 ENCOUNTER — TELEPHONE (OUTPATIENT)
Dept: PHYSICAL THERAPY | Age: 56
End: 2021-02-15

## 2021-02-18 ENCOUNTER — APPOINTMENT (OUTPATIENT)
Dept: PHYSICAL THERAPY | Age: 56
End: 2021-02-18
Attending: NURSE PRACTITIONER
Payer: COMMERCIAL

## 2021-02-22 ENCOUNTER — APPOINTMENT (OUTPATIENT)
Dept: PHYSICAL THERAPY | Age: 56
End: 2021-02-22
Attending: NURSE PRACTITIONER
Payer: COMMERCIAL

## 2021-02-25 ENCOUNTER — APPOINTMENT (OUTPATIENT)
Dept: PHYSICAL THERAPY | Age: 56
End: 2021-02-25
Attending: NURSE PRACTITIONER
Payer: COMMERCIAL

## 2021-03-01 ENCOUNTER — APPOINTMENT (OUTPATIENT)
Dept: PHYSICAL THERAPY | Age: 56
End: 2021-03-01
Attending: NURSE PRACTITIONER
Payer: COMMERCIAL

## 2021-03-04 ENCOUNTER — APPOINTMENT (OUTPATIENT)
Dept: PHYSICAL THERAPY | Age: 56
End: 2021-03-04
Attending: NURSE PRACTITIONER
Payer: COMMERCIAL

## 2021-03-08 ENCOUNTER — APPOINTMENT (OUTPATIENT)
Dept: PHYSICAL THERAPY | Age: 56
End: 2021-03-08
Attending: NURSE PRACTITIONER
Payer: COMMERCIAL

## 2021-03-11 ENCOUNTER — APPOINTMENT (OUTPATIENT)
Dept: PHYSICAL THERAPY | Age: 56
End: 2021-03-11
Attending: NURSE PRACTITIONER
Payer: COMMERCIAL

## 2021-03-24 LAB — AMB EXT COVID-19 RESULT: DETECTED

## 2021-03-25 ENCOUNTER — NURSE TRIAGE (OUTPATIENT)
Dept: FAMILY MEDICINE CLINIC | Facility: CLINIC | Age: 56
End: 2021-03-25

## 2021-03-25 NOTE — TELEPHONE ENCOUNTER
Action Requested: Summary for Provider     []  Critical Lab, Recommendations Needed  [] Need Additional Advice  [x]   FYI    []   Need Orders  [] Need Medications Sent to Pharmacy  []  Other     SUMMARY: patient's  is positive for Covid 19.  She test

## 2021-05-05 NOTE — TELEPHONE ENCOUNTER
Scheduled for:  Colonoscopy [address]392  Provider Name:  Dr. Prosper Posadas  Date:  8/12/21  Location:  [facility]  Sedation:  MAC  Time:  8:45am (pt is aware that [facility] will call the day before to confirm arrival time)   Prep:  Suprep  Meds/Allergies Reconciled?: Physician rev

## 2021-05-05 NOTE — TELEPHONE ENCOUNTER
Spoke with the patient today and she is scheduled for her procedure on 8/12/21 at the [facility]. Prep instructions will be mailed out to the patient.     TENISHA Portillo

## 2021-08-11 ENCOUNTER — TELEPHONE (OUTPATIENT)
Dept: GASTROENTEROLOGY | Facility: CLINIC | Age: 56
End: 2021-08-11

## 2021-08-11 NOTE — TELEPHONE ENCOUNTER
Patient has Questions regarding preps for 8/12/2021 CLN. Also states pharmacy did not receive preps rx. Please call. Thank you.

## 2021-08-11 NOTE — TELEPHONE ENCOUNTER
I spoke to the pt and went over all her bowel prep instructions. I answered all of her questions regarding the prep and the clear liquid diet.  She will receive a call from [facility] by 3:00 pm today with her arrival time for tomorrows procedure    Dr Zaid Wei,

## 2021-08-11 NOTE — TELEPHONE ENCOUNTER
I called Juanita in [facility] and confirmed her arrival time is 1:30 for a 2:30 procedure.     I let the pt know and she verbalizes understanding

## 2021-08-12 ENCOUNTER — SURGERY CENTER DOCUMENTATION (OUTPATIENT)
Dept: SURGERY | Age: 56
End: 2021-08-12

## 2021-08-12 ENCOUNTER — LAB REQUISITION (OUTPATIENT)
Dept: SURGERY | Age: 56
End: 2021-08-12
Payer: COMMERCIAL

## 2021-08-12 DIAGNOSIS — Z12.11 COLON CANCER SCREENING: ICD-10-CM

## 2021-08-12 PROCEDURE — 88305 TISSUE EXAM BY PATHOLOGIST: CPT | Performed by: INTERNAL MEDICINE

## 2021-08-12 NOTE — TELEPHONE ENCOUNTER
Yes please. Please call Jeff Mccauley and advise her to take the 2 anti-gas tablets now with sips of water. Thank you so much [facility].

## 2021-08-12 NOTE — PROCEDURES
[facility]      COLONOSCOPY PROCEDURE REPORT     DATE OF PROCEDURE:  8/12/2021     PCP: Cuong Hannon DO     PREOPERATIVE DIAGNOSIS: Screening colonoscopy examination     POSTOPERATIVE DIAGNOSIS:  See impression.      SURGEON:  Susana

## 2021-08-12 NOTE — TELEPHONE ENCOUNTER
Patient contacted and states since she did not have prep  instructions she was not aware that she had to take 2 anti gas tablets last night. Patient asking if she should take them now. Patient's procedure is at 2:30 pm today. Please advise.  Thank you

## 2021-08-30 ENCOUNTER — TELEPHONE (OUTPATIENT)
Dept: FAMILY MEDICINE CLINIC | Facility: CLINIC | Age: 56
End: 2021-08-30

## 2021-08-30 DIAGNOSIS — Z12.31 VISIT FOR SCREENING MAMMOGRAM: Primary | ICD-10-CM

## 2021-08-30 NOTE — TELEPHONE ENCOUNTER
Please let her know it has been a year since we saw her and she should also come in for a physical exam.  I did order her mammogram.

## 2021-09-13 ENCOUNTER — TELEPHONE (OUTPATIENT)
Dept: GASTROENTEROLOGY | Facility: CLINIC | Age: 56
End: 2021-09-13

## 2021-09-13 NOTE — TELEPHONE ENCOUNTER
Results letter mailed to patient per   Colon recall entered for repeat in 5 yrs,8/12/2026  Colon done in 8/12/2021  HM Updated and Patient Outreach was placed for Colon recall. Thuy Lopez MD  P Em Gi Clinical Staff  GI RNs - 1.  Ple

## 2021-09-16 ENCOUNTER — LAB ENCOUNTER (OUTPATIENT)
Dept: LAB | Age: 56
End: 2021-09-16
Attending: FAMILY MEDICINE
Payer: COMMERCIAL

## 2021-09-16 ENCOUNTER — OFFICE VISIT (OUTPATIENT)
Dept: FAMILY MEDICINE CLINIC | Facility: CLINIC | Age: 56
End: 2021-09-16

## 2021-09-16 VITALS
WEIGHT: 168 LBS | HEIGHT: 63.5 IN | HEART RATE: 77 BPM | DIASTOLIC BLOOD PRESSURE: 79 MMHG | BODY MASS INDEX: 29.4 KG/M2 | SYSTOLIC BLOOD PRESSURE: 107 MMHG

## 2021-09-16 DIAGNOSIS — Z00.00 ADULT GENERAL MEDICAL EXAM: Primary | ICD-10-CM
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
## 2021-09-16 DIAGNOSIS — Z00.00 ADULT GENERAL MEDICAL EXAM: ICD-10-CM

## 2021-09-16 DIAGNOSIS — Z12.83 SKIN CANCER SCREENING: ICD-10-CM

## 2021-09-16 DIAGNOSIS — R73.9 HYPERGLYCEMIA: ICD-10-CM

## 2021-09-16 DIAGNOSIS — Z12.4 CERVICAL CANCER SCREENING: ICD-10-CM

## 2021-09-16 DIAGNOSIS — Z12.31 VISIT FOR SCREENING MAMMOGRAM: ICD-10-CM

## 2021-09-16 DIAGNOSIS — Z23 NEED FOR VACCINATION: ICD-10-CM

## 2021-09-16 DIAGNOSIS — K13.79 MASS OF BUCCAL MUCOSA: ICD-10-CM

## 2021-09-16 LAB
ALBUMIN SERPL-MCNC: 4.2 G/DL (ref 3.4–5)
ALBUMIN/GLOB SERPL: 1.1 {RATIO} (ref 1–2)
ALP LIVER SERPL-CCNC: 80 U/L
ALT SERPL-CCNC: 46 U/L
ANION GAP SERPL CALC-SCNC: 7 MMOL/L (ref 0–18)
AST SERPL-CCNC: 23 U/L (ref 15–37)
BASOPHILS # BLD AUTO: 0.06 X10(3) UL (ref 0–0.2)
BASOPHILS NFR BLD AUTO: 1 %
BILIRUB SERPL-MCNC: 1.4 MG/DL (ref 0.1–2)
BUN BLD-MCNC: 11 MG/DL (ref 7–18)
BUN/CREAT SERPL: 16.2 (ref 10–20)
CALCIUM BLD-MCNC: 9.4 MG/DL (ref 8.5–10.1)
CHLORIDE SERPL-SCNC: 107 MMOL/L (ref 98–112)
CHOLEST SERPL-MCNC: 246 MG/DL (ref ?–200)
CO2 SERPL-SCNC: 25 MMOL/L (ref 21–32)
CREAT BLD-MCNC: 0.68 MG/DL
DEPRECATED RDW RBC AUTO: 41.2 FL (ref 35.1–46.3)
EOSINOPHIL # BLD AUTO: 0.12 X10(3) UL (ref 0–0.7)
EOSINOPHIL NFR BLD AUTO: 2.1 %
ERYTHROCYTE [DISTWIDTH] IN BLOOD BY AUTOMATED COUNT: 12.1 % (ref 11–15)
EST. AVERAGE GLUCOSE BLD GHB EST-MCNC: 128 MG/DL (ref 68–126)
GLOBULIN PLAS-MCNC: 3.7 G/DL (ref 2.8–4.4)
GLUCOSE BLD-MCNC: 103 MG/DL (ref 70–99)
HBA1C MFR BLD HPLC: 6.1 % (ref ?–5.7)
HCT VFR BLD AUTO: 41.8 %
HDLC SERPL-MCNC: 42 MG/DL (ref 40–59)
HGB BLD-MCNC: 14 G/DL
IMM GRANULOCYTES # BLD AUTO: 0.01 X10(3) UL (ref 0–1)
IMM GRANULOCYTES NFR BLD: 0.2 %
LDLC SERPL CALC-MCNC: 179 MG/DL (ref ?–100)
LYMPHOCYTES # BLD AUTO: 2.43 X10(3) UL (ref 1–4)
LYMPHOCYTES NFR BLD AUTO: 41.5 %
MCH RBC QN AUTO: 30.8 PG (ref 26–34)
MCHC RBC AUTO-ENTMCNC: 33.5 G/DL (ref 31–37)
MCV RBC AUTO: 92.1 FL
MONOCYTES # BLD AUTO: 0.47 X10(3) UL (ref 0.1–1)
MONOCYTES NFR BLD AUTO: 8 %
NEUTROPHILS # BLD AUTO: 2.76 X10 (3) UL (ref 1.5–7.7)
NEUTROPHILS # BLD AUTO: 2.76 X10(3) UL (ref 1.5–7.7)
NEUTROPHILS NFR BLD AUTO: 47.2 %
NONHDLC SERPL-MCNC: 204 MG/DL (ref ?–130)
OSMOLALITY SERPL CALC.SUM OF ELEC: 288 MOSM/KG (ref 275–295)
PATIENT FASTING Y/N/NP: YES
PATIENT FASTING Y/N/NP: YES
PLATELET # BLD AUTO: 302 10(3)UL (ref 150–450)
POTASSIUM SERPL-SCNC: 4.4 MMOL/L (ref 3.5–5.1)
PROT SERPL-MCNC: 7.9 G/DL (ref 6.4–8.2)
RBC # BLD AUTO: 4.54 X10(6)UL
SODIUM SERPL-SCNC: 139 MMOL/L (ref 136–145)
TRIGL SERPL-MCNC: 137 MG/DL (ref 30–149)
TSI SER-ACNC: 0.62 MIU/ML (ref 0.36–3.74)
VIT D+METAB SERPL-MCNC: 29.9 NG/ML (ref 30–100)
VLDLC SERPL CALC-MCNC: 28 MG/DL (ref 0–30)
WBC # BLD AUTO: 5.9 X10(3) UL (ref 4–11)

## 2021-09-16 PROCEDURE — 80061 LIPID PANEL: CPT

## 2021-09-16 PROCEDURE — 80053 COMPREHEN METABOLIC PANEL: CPT

## 2021-09-16 PROCEDURE — 85025 COMPLETE CBC W/AUTO DIFF WBC: CPT

## 2021-09-16 PROCEDURE — 90750 HZV VACC RECOMBINANT IM: CPT | Performed by: FAMILY MEDICINE

## 2021-09-16 PROCEDURE — 99396 PREV VISIT EST AGE 40-64: CPT | Performed by: FAMILY MEDICINE

## 2021-09-16 PROCEDURE — G0439 PPPS, SUBSEQ VISIT: HCPCS | Performed by: FAMILY MEDICINE

## 2021-09-16 PROCEDURE — 82306 VITAMIN D 25 HYDROXY: CPT

## 2021-09-16 PROCEDURE — 3008F BODY MASS INDEX DOCD: CPT | Performed by: FAMILY MEDICINE

## 2021-09-16 PROCEDURE — 3078F DIAST BP <80 MM HG: CPT | Performed by: FAMILY MEDICINE

## 2021-09-16 PROCEDURE — 84443 ASSAY THYROID STIM HORMONE: CPT

## 2021-09-16 PROCEDURE — 90471 IMMUNIZATION ADMIN: CPT | Performed by: FAMILY MEDICINE

## 2021-09-16 PROCEDURE — 3074F SYST BP LT 130 MM HG: CPT | Performed by: FAMILY MEDICINE

## 2021-09-16 PROCEDURE — 83036 HEMOGLOBIN GLYCOSYLATED A1C: CPT

## 2021-09-16 PROCEDURE — 36415 COLL VENOUS BLD VENIPUNCTURE: CPT

## 2021-09-16 NOTE — PROGRESS NOTES
Patient ID: Gregorio Britton is a 64year old female. HPI  Patient presents with:  Routine Physical    Last physical on 11/22/2019. Pt works part time at [employer] for her , also works at [employer], and does not smoke.      Pt has a pool and state CREATSERUM 0.69 12/02/2019    ANIONGAP 3 12/02/2019    GFRNAA 99 12/02/2019    GFRAA 114 12/02/2019    CA 8.8 12/02/2019    OSMOCALC 289 12/02/2019    ALKPHO 62 12/02/2019    AST 15 12/02/2019    ALT 30 12/02/2019    ALKPHOS 52 10/18/2013    BILT 0.7 12/02 Negative for shortness of breath. Cardiovascular: Negative for chest pain.          Past Medical History:   Diagnosis Date   • Abnormal Pap smear of cervix     per [employer]:  \"Abnormal pap smear\"   • Lipid screening 10-    per NextGen       Past file  Physical Activity:       Days of Exercise per Week: Not on file      Minutes of Exercise per Session: Not on file  Stress:       Feeling of Stress : Not on file  Social Connections:       Frequency of Communication with Friends and Family: Not on mariah reviewed. Blood pressure 107/79, pulse 77, height 5' 3.5\" (1.613 m), weight 168 lb, not currently breastfeeding.          ASSESSMENT/PLAN:     Diagnoses and all orders for this visit:    Adult general medical exam  -     CBC WITH DIFFERENTIAL WITH PLATE up if symptoms persist.  Take medicine (if given) as prescribed. Approach to treatment discussed and patient/family member understands and agrees to plan. No follow-ups on file.     Patient Instructions   Return to clinic after November 16, 2021 for nu

## 2021-10-28 ENCOUNTER — OFFICE VISIT (OUTPATIENT)
Dept: OTOLARYNGOLOGY | Facility: CLINIC | Age: 56
End: 2021-10-28

## 2021-10-28 VITALS — BODY MASS INDEX: 29.77 KG/M2 | WEIGHT: 168 LBS | HEIGHT: 63 IN

## 2021-10-28 DIAGNOSIS — K13.0 LIP LESION: Primary | ICD-10-CM

## 2021-10-28 DIAGNOSIS — G47.33 OBSTRUCTIVE SLEEP APNEA: ICD-10-CM

## 2021-10-28 PROCEDURE — 3008F BODY MASS INDEX DOCD: CPT | Performed by: OTOLARYNGOLOGY

## 2021-10-28 PROCEDURE — 99243 OFF/OP CNSLTJ NEW/EST LOW 30: CPT | Performed by: OTOLARYNGOLOGY

## 2021-10-29 NOTE — PROGRESS NOTES
Eloina Greene is a 64year old female. Patient presents with:  Mass: Pt has a ct/o mass on inside of mouth on left side .  Pt says its been there for years       HISTORY OF PRESENT ILLNESS  She presents primarily for complaint of a lesion inside her mout changes. Respiratory Negative Dyspnea and wheezing. Cardio Negative Chest pain, irregular heartbeat/palpitations and syncope. GI Negative Abdominal pain and diarrhea. Endocrine Negative Cold intolerance and heat intolerance.    Neuro Negative Tremor Obstructive sleep apnea  She has a inner lip lesion on the right which is amenable to removal here in the office under local anesthesia. We did discuss having her return at her convenience to have this done.   We also discussed some of her issues regarding

## 2021-11-11 ENCOUNTER — OFFICE VISIT (OUTPATIENT)
Dept: OTOLARYNGOLOGY | Facility: CLINIC | Age: 56
End: 2021-11-11

## 2021-11-11 VITALS — HEIGHT: 63 IN | WEIGHT: 168 LBS | BODY MASS INDEX: 29.77 KG/M2

## 2021-11-11 DIAGNOSIS — K13.70 ORAL LESION: Primary | ICD-10-CM

## 2021-11-11 PROCEDURE — 40812 EXCISE/REPAIR MOUTH LESION: CPT | Performed by: OTOLARYNGOLOGY

## 2021-11-11 PROCEDURE — 3008F BODY MASS INDEX DOCD: CPT | Performed by: OTOLARYNGOLOGY

## 2021-11-12 NOTE — PROGRESS NOTES
Dellar Severs is a 64year old female. Patient presents with:  Procedure: excision of inner lip lesion       HISTORY OF PRESENT ILLNESS  She presents primarily for complaint of a lesion inside her mouth on the left side.   States has been present for yea ENMT Negative Drooling. Eyes Negative Blurred vision and vision changes. Respiratory Negative Dyspnea and wheezing. Cardio Negative Chest pain, irregular heartbeat/palpitations and syncope. GI Negative Abdominal pain and diarrhea.    Endocrine Neg performed appropriate patient and site were identified and marked appropriately. The area in question of the left in her buccal mucosa near the left labial commissure was infiltrated with 1% Xylocaine with 1 200,000 epinephrine.   Using aseptic technique t

## 2021-11-18 ENCOUNTER — NURSE ONLY (OUTPATIENT)
Dept: FAMILY MEDICINE CLINIC | Facility: CLINIC | Age: 56
End: 2021-11-18

## 2021-11-18 DIAGNOSIS — Z23 NEED FOR VACCINATION: Primary | ICD-10-CM

## 2021-11-18 PROCEDURE — 90750 HZV VACC RECOMBINANT IM: CPT | Performed by: FAMILY MEDICINE

## 2021-11-18 PROCEDURE — 90471 IMMUNIZATION ADMIN: CPT | Performed by: FAMILY MEDICINE

## 2021-11-18 NOTE — PROGRESS NOTES
Patient is here for second dose of shingles she has verified name and .  Patient has tolerated injection well

## 2021-11-29 ENCOUNTER — HOSPITAL ENCOUNTER (OUTPATIENT)
Dept: MAMMOGRAPHY | Age: 56
Discharge: HOME | End: 2021-11-29
Attending: FAMILY MEDICINE
Payer: COMMERCIAL

## 2021-11-29 DIAGNOSIS — Z12.31 VISIT FOR SCREENING MAMMOGRAM: ICD-10-CM

## 2021-11-29 PROCEDURE — 77067 SCR MAMMO BI INCL CAD: CPT | Performed by: FAMILY MEDICINE

## 2021-11-29 PROCEDURE — 77063 BREAST TOMOSYNTHESIS BI: CPT | Performed by: FAMILY MEDICINE

## 2021-12-13 ENCOUNTER — OFFICE VISIT (OUTPATIENT)
Dept: OBGYN CLINIC | Facility: CLINIC | Age: 56
End: 2021-12-13

## 2021-12-13 VITALS — SYSTOLIC BLOOD PRESSURE: 134 MMHG | HEART RATE: 91 BPM | DIASTOLIC BLOOD PRESSURE: 85 MMHG

## 2021-12-13 DIAGNOSIS — Z01.419 ENCOUNTER FOR GYNECOLOGICAL EXAMINATION WITHOUT ABNORMAL FINDING: Primary | ICD-10-CM

## 2021-12-13 PROCEDURE — 3075F SYST BP GE 130 - 139MM HG: CPT | Performed by: OBSTETRICS & GYNECOLOGY

## 2021-12-13 PROCEDURE — 99396 PREV VISIT EST AGE 40-64: CPT | Performed by: OBSTETRICS & GYNECOLOGY

## 2021-12-13 PROCEDURE — 3079F DIAST BP 80-89 MM HG: CPT | Performed by: OBSTETRICS & GYNECOLOGY

## 2021-12-13 NOTE — PROGRESS NOTES
Danitza Lees is a 64year old female  No LMP recorded. Patient is perimenopausal. Patient presents with:  Gyn Exam: ANNUAL EXAM -- prior [facility] pt. No  bleed. I will see daughter next week  .     OBSTETRICS HISTORY:  OB History    Para Te denies easy bruising or bleeding      PHYSICAL EXAM:   Blood pressure 134/85, pulse 91, not currently breastfeeding.   Constitutional:  well developed, well nourished  Head/Face:  normocephalic  Neck/Thyroid: thyroid symmetric, no thyromegaly, no nodules,

## 2022-04-24 ENCOUNTER — HOSPITAL ENCOUNTER (OUTPATIENT)
Age: 57
Discharge: HOME | End: 2022-04-24
Payer: COMMERCIAL

## 2022-04-24 ENCOUNTER — APPOINTMENT (OUTPATIENT)
Dept: GENERAL RADIOLOGY | Age: 57
End: 2022-04-24
Attending: NURSE PRACTITIONER
Payer: COMMERCIAL

## 2022-04-24 VITALS
HEIGHT: 63 IN | HEART RATE: 101 BPM | SYSTOLIC BLOOD PRESSURE: 151 MMHG | RESPIRATION RATE: 18 BRPM | OXYGEN SATURATION: 97 % | BODY MASS INDEX: 28.35 KG/M2 | WEIGHT: 160 LBS | DIASTOLIC BLOOD PRESSURE: 73 MMHG | TEMPERATURE: 97 F

## 2022-04-24 DIAGNOSIS — S83.411A SPRAIN OF MEDIAL COLLATERAL LIGAMENT OF RIGHT KNEE, INITIAL ENCOUNTER: ICD-10-CM
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
## 2022-04-24 PROCEDURE — L1830 KO IMMOB CANVAS LONG PRE OTS: HCPCS | Performed by: NURSE PRACTITIONER

## 2022-04-24 PROCEDURE — 73560 X-RAY EXAM OF KNEE 1 OR 2: CPT | Performed by: NURSE PRACTITIONER

## 2022-04-24 PROCEDURE — 99203 OFFICE O/P NEW LOW 30 MIN: CPT | Performed by: NURSE PRACTITIONER
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
## 2022-04-28 ENCOUNTER — TELEPHONE (OUTPATIENT)
Dept: CASE MANAGEMENT | Age: 57
End: 2022-04-28

## 2022-04-28 ENCOUNTER — TELEPHONE (OUTPATIENT)
Dept: FAMILY MEDICINE CLINIC | Facility: CLINIC | Age: 57
End: 2022-04-28

## 2022-04-28 NOTE — TELEPHONE ENCOUNTER
Dr. Fer Ash,    Patient requesting a referral to Dr. Matt Live for a hyper extended knee. Pended referral please review diagnosis and sign off if you agree. Thank you.   [facility]  Referral Department

## 2022-04-28 NOTE — TELEPHONE ENCOUNTER
Patient requesting earlier appointment date with Dr. Ga for imm. Care follow up . Dr. Ga next available appointment is 5/2 that is the same date she has with orthopedic doctor. Patient is not sure if Dr. Ga will need to see her in order to provide referral for Dr. Saul Garcia.

## 2022-04-28 NOTE — TELEPHONE ENCOUNTER
Spoke, with the patient and informed her of the message below. Pt did make an appt to see Dr. Major Magana for tomorrow at 9:30 a res 24 slot. Pt also informed that this was a focused visit for the knee.

## 2022-04-28 NOTE — TELEPHONE ENCOUNTER
Patient requesting referral for orthopedic. Reason- hyper extended knee    Dr. Matt Live    Patient has appointment scheduled on 5/2. Requesting high priority.

## 2022-04-29 ENCOUNTER — OFFICE VISIT (OUTPATIENT)
Dept: DERMATOLOGY CLINIC | Facility: CLINIC | Age: 57
End: 2022-04-29
Payer: COMMERCIAL

## 2022-04-29 ENCOUNTER — OFFICE VISIT (OUTPATIENT)
Dept: FAMILY MEDICINE CLINIC | Facility: CLINIC | Age: 57
End: 2022-04-29
Payer: COMMERCIAL

## 2022-04-29 VITALS
TEMPERATURE: 99 F | HEIGHT: 63 IN | SYSTOLIC BLOOD PRESSURE: 130 MMHG | WEIGHT: 174 LBS | HEART RATE: 106 BPM | DIASTOLIC BLOOD PRESSURE: 87 MMHG | BODY MASS INDEX: 30.83 KG/M2

## 2022-04-29 DIAGNOSIS — D23.5 BENIGN NEOPLASM OF SKIN OF TRUNK, EXCEPT SCROTUM: ICD-10-CM

## 2022-04-29 DIAGNOSIS — D22.9 MULTIPLE NEVI: ICD-10-CM

## 2022-04-29 DIAGNOSIS — L81.2 EPHELIDES: ICD-10-CM

## 2022-04-29 DIAGNOSIS — D23.60 BENIGN NEOPLASM OF SKIN OF UPPER LIMB, INCLUDING SHOULDER, UNSPECIFIED LATERALITY: ICD-10-CM

## 2022-04-29 DIAGNOSIS — S83.411A SPRAIN OF MEDIAL COLLATERAL LIGAMENT OF RIGHT KNEE, INITIAL ENCOUNTER: Primary | ICD-10-CM

## 2022-04-29 DIAGNOSIS — M25.561 ACUTE PAIN OF RIGHT KNEE: ICD-10-CM

## 2022-04-29 DIAGNOSIS — D23.30 BENIGN NEOPLASM OF SKIN OF FACE: ICD-10-CM

## 2022-04-29 DIAGNOSIS — D23.4 BENIGN NEOPLASM OF SCALP AND SKIN OF NECK: ICD-10-CM

## 2022-04-29 DIAGNOSIS — D48.5 NEOPLASM OF UNCERTAIN BEHAVIOR OF SKIN: Primary | ICD-10-CM

## 2022-04-29 PROCEDURE — 88305 TISSUE EXAM BY PATHOLOGIST: CPT | Performed by: DERMATOLOGY

## 2022-04-29 PROCEDURE — 99203 OFFICE O/P NEW LOW 30 MIN: CPT | Performed by: DERMATOLOGY

## 2022-04-29 PROCEDURE — 99214 OFFICE O/P EST MOD 30 MIN: CPT | Performed by: FAMILY MEDICINE

## 2022-04-29 PROCEDURE — 3008F BODY MASS INDEX DOCD: CPT | Performed by: FAMILY MEDICINE

## 2022-04-29 PROCEDURE — 11102 TANGNTL BX SKIN SINGLE LES: CPT | Performed by: DERMATOLOGY

## 2022-04-29 PROCEDURE — 3075F SYST BP GE 130 - 139MM HG: CPT | Performed by: FAMILY MEDICINE

## 2022-04-29 PROCEDURE — 3079F DIAST BP 80-89 MM HG: CPT | Performed by: FAMILY MEDICINE

## 2022-04-29 NOTE — PROGRESS NOTES
Operative Report                     Shave/  Tangential biopsy     Clinical diagnosis:    Size of lesion:    Location:lesion noted on exam  Spec 1 Description >>>>>: left lateral upper arm  Spec 1 Comment: r/o atypical pigmented lesion 2mm dark black macule speckled, no obvious pigment network on dermoscopy    Procedure: With patient in appropriate position the skin of the above was scrubbed with alcohol. Anesthesia was obtained with 1% Xylocaine with epinephrine. The skin surrounding the lesion was placed under tension and the lesion was incised using a #15 scalpel blade. The specimen was sent for histopathologic exam.    Hemostasis was obtained with electrocautery/aluminum chloride. Estimated blood loss less than 2 cc. Biopsy dressed with Polysporin, bandage. Pressure dressing:   No    Complications: None    Written instructions given and reviewed with patient    Await pathology    Contact information reviewed.     Procedural physician:  Candido Salazar MD

## 2022-05-06 NOTE — PROGRESS NOTES
The pathology report from last visit showed junctional lentiginous nevusleeft lateral upper arm. Please log in test results, send biopsy results letter. Pt to rtc 1 year or prn.

## 2022-11-17 ENCOUNTER — OFFICE VISIT (OUTPATIENT)
Dept: FAMILY MEDICINE CLINIC | Facility: CLINIC | Age: 57
End: 2022-11-17
Payer: COMMERCIAL

## 2022-11-17 VITALS
BODY MASS INDEX: 30.48 KG/M2 | DIASTOLIC BLOOD PRESSURE: 69 MMHG | WEIGHT: 172 LBS | SYSTOLIC BLOOD PRESSURE: 108 MMHG | HEART RATE: 83 BPM | HEIGHT: 63 IN

## 2022-11-17 DIAGNOSIS — H69.80 DYSFUNCTION OF EUSTACHIAN TUBE, UNSPECIFIED LATERALITY: Primary | ICD-10-CM

## 2022-11-17 PROCEDURE — 99213 OFFICE O/P EST LOW 20 MIN: CPT | Performed by: FAMILY MEDICINE

## 2022-11-17 PROCEDURE — 3078F DIAST BP <80 MM HG: CPT | Performed by: FAMILY MEDICINE

## 2022-11-17 PROCEDURE — 3074F SYST BP LT 130 MM HG: CPT | Performed by: FAMILY MEDICINE

## 2022-11-17 PROCEDURE — 3008F BODY MASS INDEX DOCD: CPT | Performed by: FAMILY MEDICINE

## 2022-11-17 RX ORDER — FLUTICASONE PROPIONATE 50 MCG
2 SPRAY, SUSPENSION (ML) NASAL DAILY
Qty: 1 EACH | Refills: 1 | Status: SHIPPED | OUTPATIENT
Start: 2022-11-17 | End: 2023-03-17

## 2022-11-17 NOTE — PROGRESS NOTES
Blood pressure 108/69, pulse 83, height 5' 3\" (1.6 m), weight 172 lb (78 kg), not currently breastfeeding. Patient presents today following up for left ear dullness and fullness with decreased hearing. Denies pain. Nasal congestion several weeks ago lasted for several weeks. No fever or pain at this time.     Objective left ear with TM intact no redness    Assessment eustachian tube dysfunction    Plan Flonase prescription printed information given follow-up if no improvement

## 2022-11-18 ENCOUNTER — TELEPHONE (OUTPATIENT)
Dept: FAMILY MEDICINE CLINIC | Facility: CLINIC | Age: 57
End: 2022-11-18

## 2022-11-18 DIAGNOSIS — Z12.31 BREAST CANCER SCREENING BY MAMMOGRAM: Primary | ICD-10-CM

## 2022-11-20 NOTE — TELEPHONE ENCOUNTER
Also inform her that she was supposed to have a physical exam in September of this year. See if she can get in before the end of the year with me.

## 2023-01-03 ENCOUNTER — NURSE TRIAGE (OUTPATIENT)
Dept: FAMILY MEDICINE CLINIC | Facility: CLINIC | Age: 58
End: 2023-01-03

## 2023-01-05 ENCOUNTER — TELEPHONE (OUTPATIENT)
Dept: FAMILY MEDICINE CLINIC | Facility: CLINIC | Age: 58
End: 2023-01-05

## 2023-01-05 ENCOUNTER — OFFICE VISIT (OUTPATIENT)
Dept: FAMILY MEDICINE CLINIC | Facility: CLINIC | Age: 58
End: 2023-01-05
Payer: COMMERCIAL

## 2023-01-05 VITALS
HEART RATE: 93 BPM | HEIGHT: 63 IN | WEIGHT: 167.19 LBS | SYSTOLIC BLOOD PRESSURE: 119 MMHG | DIASTOLIC BLOOD PRESSURE: 77 MMHG | BODY MASS INDEX: 29.62 KG/M2

## 2023-01-05 DIAGNOSIS — L60.0 INGROWING TOENAIL OF RIGHT FOOT: Primary | ICD-10-CM

## 2023-01-05 DIAGNOSIS — H92.03 OTALGIA OF BOTH EARS: ICD-10-CM

## 2023-01-05 PROCEDURE — 3078F DIAST BP <80 MM HG: CPT | Performed by: FAMILY MEDICINE

## 2023-01-05 PROCEDURE — 99213 OFFICE O/P EST LOW 20 MIN: CPT | Performed by: FAMILY MEDICINE

## 2023-01-05 PROCEDURE — 3008F BODY MASS INDEX DOCD: CPT | Performed by: FAMILY MEDICINE

## 2023-01-05 PROCEDURE — 3074F SYST BP LT 130 MM HG: CPT | Performed by: FAMILY MEDICINE

## 2023-01-05 RX ORDER — HYDROCORTISONE ACETATE 25 MG/1
25 SUPPOSITORY RECTAL 2 TIMES DAILY
Qty: 7 SUPPOSITORY | Refills: 1 | Status: SHIPPED | OUTPATIENT
Start: 2023-01-05 | End: 2023-01-05

## 2023-01-05 NOTE — TELEPHONE ENCOUNTER
Marina from [employer] is calling for verification on quantity on fax prescription received for:    ANUSOL-C  25 MG    Please advise

## 2023-01-16 ENCOUNTER — HOSPITAL ENCOUNTER (OUTPATIENT)
Dept: MAMMOGRAPHY | Age: 58
Discharge: HOME | End: 2023-01-16
Attending: FAMILY MEDICINE
Payer: COMMERCIAL

## 2023-01-16 DIAGNOSIS — Z12.31 BREAST CANCER SCREENING BY MAMMOGRAM: ICD-10-CM

## 2023-01-16 PROCEDURE — 77067 SCR MAMMO BI INCL CAD: CPT | Performed by: FAMILY MEDICINE

## 2023-01-16 PROCEDURE — 77063 BREAST TOMOSYNTHESIS BI: CPT | Performed by: FAMILY MEDICINE

## 2023-01-17 ENCOUNTER — OFFICE VISIT (OUTPATIENT)
Dept: FAMILY MEDICINE CLINIC | Facility: CLINIC | Age: 58
End: 2023-01-17

## 2023-01-17 VITALS
DIASTOLIC BLOOD PRESSURE: 78 MMHG | WEIGHT: 167 LBS | HEART RATE: 77 BPM | SYSTOLIC BLOOD PRESSURE: 124 MMHG | HEIGHT: 63 IN | BODY MASS INDEX: 29.59 KG/M2

## 2023-01-17 DIAGNOSIS — L60.0 INGROWING TOENAIL OF RIGHT FOOT: Primary | ICD-10-CM

## 2023-01-17 PROCEDURE — 3008F BODY MASS INDEX DOCD: CPT | Performed by: FAMILY MEDICINE

## 2023-01-17 PROCEDURE — 11730 AVULSION NAIL PLATE SIMPLE 1: CPT | Performed by: FAMILY MEDICINE

## 2023-01-17 PROCEDURE — 3078F DIAST BP <80 MM HG: CPT | Performed by: FAMILY MEDICINE

## 2023-01-17 PROCEDURE — 3074F SYST BP LT 130 MM HG: CPT | Performed by: FAMILY MEDICINE

## 2023-01-17 RX ORDER — IBUPROFEN 600 MG/1
600 TABLET ORAL EVERY 6 HOURS PRN
Qty: 30 TABLET | Refills: 0 | Status: SHIPPED | OUTPATIENT
Start: 2023-01-17

## 2023-01-19 LAB — CALCOFLUOR WHITE STAIN RESULT: NEGATIVE

## 2023-01-26 ENCOUNTER — OFFICE VISIT (OUTPATIENT)
Dept: FAMILY MEDICINE CLINIC | Facility: CLINIC | Age: 58
End: 2023-01-26

## 2023-01-26 VITALS
HEIGHT: 63 IN | SYSTOLIC BLOOD PRESSURE: 109 MMHG | DIASTOLIC BLOOD PRESSURE: 72 MMHG | BODY MASS INDEX: 29.91 KG/M2 | WEIGHT: 168.81 LBS | HEART RATE: 86 BPM

## 2023-01-26 DIAGNOSIS — B35.1 ONYCHOMYCOSIS OF GREAT TOE: ICD-10-CM

## 2023-01-26 DIAGNOSIS — L60.0 INGROWING TOENAIL OF RIGHT FOOT: Primary | ICD-10-CM

## 2023-01-26 PROCEDURE — 3074F SYST BP LT 130 MM HG: CPT | Performed by: FAMILY MEDICINE

## 2023-01-26 PROCEDURE — 99213 OFFICE O/P EST LOW 20 MIN: CPT | Performed by: FAMILY MEDICINE

## 2023-01-26 PROCEDURE — 3078F DIAST BP <80 MM HG: CPT | Performed by: FAMILY MEDICINE

## 2023-01-26 PROCEDURE — 3008F BODY MASS INDEX DOCD: CPT | Performed by: FAMILY MEDICINE

## 2023-01-26 RX ORDER — FLUCONAZOLE 200 MG/1
200 TABLET ORAL WEEKLY
Qty: 12 TABLET | Refills: 0 | Status: SHIPPED | OUTPATIENT
Start: 2023-01-26 | End: 2023-02-25

## 2023-01-27 ENCOUNTER — TELEPHONE (OUTPATIENT)
Dept: FAMILY MEDICINE CLINIC | Facility: CLINIC | Age: 58
End: 2023-01-27

## 2023-01-27 DIAGNOSIS — B35.1 ONYCHOMYCOSIS OF GREAT TOE: Primary | ICD-10-CM

## 2023-01-27 DIAGNOSIS — L60.0 INGROWING TOENAIL OF RIGHT FOOT: ICD-10-CM

## 2023-01-27 NOTE — TELEPHONE ENCOUNTER
Patient is fearful due to the side of effect since it will be for 3 months or longer    Asking if she can do laser treatments and or have a podiatry referral    Please advise

## 2023-01-27 NOTE — TELEPHONE ENCOUNTER
Patient calling to request alternative to medication for toe fungus, could not verify medication name. Stated is worried about side effects. Wanted to let both Dr. Nora Domingo and Dr. Ju Rose know, as Dr. Nora Domingo prescribed medication and Dr. Ju Rose is PCP.

## 2023-01-28 NOTE — TELEPHONE ENCOUNTER
NO, but if she does not want to take it is up to her. If she want to come back to talk about it with me or her PCP is okay.

## 2023-01-28 NOTE — TELEPHONE ENCOUNTER
Referred to Provider Information:  Provider Address Phone   Zabrina Regalado, [address] [phone] [address]  Patient notified of referral and given contact information. She was very appreciative.

## 2023-01-28 NOTE — TELEPHONE ENCOUNTER
Spoke with patient,  verified. Gave her Dr Osito Trivedi response. She verbalized understanding. Patient requested this message to be sent to Dr Hanh Berrios give her referral to Podiatry for a second opinion on toenail fungus? She would like to see Dr Angeli Urban in [address] [place] [address]. This RN advised her to check with her HMO to see if in network. Patient would also like any recommendation from Dr Chelsea Berrios, please advise?

## 2023-02-03 ENCOUNTER — OFFICE VISIT (OUTPATIENT)
Dept: PODIATRY CLINIC | Facility: CLINIC | Age: 58
End: 2023-02-03

## 2023-02-03 ENCOUNTER — TELEPHONE (OUTPATIENT)
Dept: PODIATRY CLINIC | Facility: CLINIC | Age: 58
End: 2023-02-03

## 2023-02-03 DIAGNOSIS — B35.1 ONYCHOMYCOSIS: Primary | ICD-10-CM

## 2023-02-03 PROCEDURE — 99203 OFFICE O/P NEW LOW 30 MIN: CPT | Performed by: STUDENT IN AN ORGANIZED HEALTH CARE EDUCATION/TRAINING PROGRAM

## 2023-02-03 RX ORDER — EFINACONAZOLE 100 MG/ML
1 SOLUTION TOPICAL
Qty: 8 ML | Refills: 3 | Status: SHIPPED | OUTPATIENT
Start: 2023-02-04 | End: 2023-05-05

## 2023-02-03 NOTE — TELEPHONE ENCOUNTER
Pt called stating pt had an appointment this morning 2-3-23. Advised would be sending two rx. Pt called the pharmacy. Only one was sent. Preferred rx. Was not sent.   Send to [facility].  Call pt

## 2023-03-02 ENCOUNTER — LAB ENCOUNTER (OUTPATIENT)
Dept: LAB | Age: 58
End: 2023-03-02
Attending: FAMILY MEDICINE
Payer: COMMERCIAL

## 2023-03-02 ENCOUNTER — OFFICE VISIT (OUTPATIENT)
Dept: FAMILY MEDICINE CLINIC | Facility: CLINIC | Age: 58
End: 2023-03-02

## 2023-03-02 VITALS
DIASTOLIC BLOOD PRESSURE: 80 MMHG | HEIGHT: 63 IN | SYSTOLIC BLOOD PRESSURE: 120 MMHG | TEMPERATURE: 97 F | WEIGHT: 168 LBS | BODY MASS INDEX: 29.77 KG/M2 | HEART RATE: 88 BPM

## 2023-03-02 DIAGNOSIS — Z00.00 ADULT GENERAL MEDICAL EXAM: Primary | ICD-10-CM

## 2023-03-02 DIAGNOSIS — Z00.00 ADULT GENERAL MEDICAL EXAM: ICD-10-CM

## 2023-03-02 LAB
ALBUMIN SERPL-MCNC: 4.1 G/DL (ref 3.4–5)
ALBUMIN/GLOB SERPL: 1.2 {RATIO} (ref 1–2)
ALP LIVER SERPL-CCNC: 71 U/L
ALT SERPL-CCNC: 29 U/L
ANION GAP SERPL CALC-SCNC: 4 MMOL/L (ref 0–18)
AST SERPL-CCNC: 14 U/L (ref 15–37)
BASOPHILS # BLD AUTO: 0.05 X10(3) UL (ref 0–0.2)
BASOPHILS NFR BLD AUTO: 0.8 %
BILIRUB SERPL-MCNC: 1.4 MG/DL (ref 0.1–2)
BUN BLD-MCNC: 15 MG/DL (ref 7–18)
BUN/CREAT SERPL: 20.8 (ref 10–20)
CALCIUM BLD-MCNC: 9.2 MG/DL (ref 8.5–10.1)
CHLORIDE SERPL-SCNC: 108 MMOL/L (ref 98–112)
CHOLEST SERPL-MCNC: 233 MG/DL (ref ?–200)
CO2 SERPL-SCNC: 27 MMOL/L (ref 21–32)
CREAT BLD-MCNC: 0.72 MG/DL
DEPRECATED RDW RBC AUTO: 40.2 FL (ref 35.1–46.3)
EOSINOPHIL # BLD AUTO: 0.14 X10(3) UL (ref 0–0.7)
EOSINOPHIL NFR BLD AUTO: 2.4 %
ERYTHROCYTE [DISTWIDTH] IN BLOOD BY AUTOMATED COUNT: 11.9 % (ref 11–15)
FASTING PATIENT LIPID ANSWER: YES
FASTING STATUS PATIENT QL REPORTED: YES
GFR SERPLBLD BASED ON 1.73 SQ M-ARVRAT: 97 ML/MIN/1.73M2 (ref 60–?)
GLOBULIN PLAS-MCNC: 3.4 G/DL (ref 2.8–4.4)
GLUCOSE BLD-MCNC: 104 MG/DL (ref 70–99)
HCT VFR BLD AUTO: 41.3 %
HDLC SERPL-MCNC: 42 MG/DL (ref 40–59)
HGB BLD-MCNC: 14.1 G/DL
IMM GRANULOCYTES # BLD AUTO: 0.01 X10(3) UL (ref 0–1)
IMM GRANULOCYTES NFR BLD: 0.2 %
LDLC SERPL CALC-MCNC: 167 MG/DL (ref ?–100)
LYMPHOCYTES # BLD AUTO: 2.77 X10(3) UL (ref 1–4)
LYMPHOCYTES NFR BLD AUTO: 47 %
MCH RBC QN AUTO: 31.3 PG (ref 26–34)
MCHC RBC AUTO-ENTMCNC: 34.1 G/DL (ref 31–37)
MCV RBC AUTO: 91.8 FL
MONOCYTES # BLD AUTO: 0.46 X10(3) UL (ref 0.1–1)
MONOCYTES NFR BLD AUTO: 7.8 %
NEUTROPHILS # BLD AUTO: 2.46 X10 (3) UL (ref 1.5–7.7)
NEUTROPHILS # BLD AUTO: 2.46 X10(3) UL (ref 1.5–7.7)
NEUTROPHILS NFR BLD AUTO: 41.8 %
NONHDLC SERPL-MCNC: 191 MG/DL (ref ?–130)
OSMOLALITY SERPL CALC.SUM OF ELEC: 289 MOSM/KG (ref 275–295)
PLATELET # BLD AUTO: 314 10(3)UL (ref 150–450)
POTASSIUM SERPL-SCNC: 4.2 MMOL/L (ref 3.5–5.1)
PROT SERPL-MCNC: 7.5 G/DL (ref 6.4–8.2)
RBC # BLD AUTO: 4.5 X10(6)UL
SODIUM SERPL-SCNC: 139 MMOL/L (ref 136–145)
TRIGL SERPL-MCNC: 131 MG/DL (ref 30–149)
TSI SER-ACNC: 0.86 MIU/ML (ref 0.36–3.74)
VIT D+METAB SERPL-MCNC: 30.3 NG/ML (ref 30–100)
VLDLC SERPL CALC-MCNC: 26 MG/DL (ref 0–30)
WBC # BLD AUTO: 5.9 X10(3) UL (ref 4–11)

## 2023-03-02 PROCEDURE — 80053 COMPREHEN METABOLIC PANEL: CPT

## 2023-03-02 PROCEDURE — 3079F DIAST BP 80-89 MM HG: CPT | Performed by: FAMILY MEDICINE

## 2023-03-02 PROCEDURE — 3074F SYST BP LT 130 MM HG: CPT | Performed by: FAMILY MEDICINE

## 2023-03-02 PROCEDURE — 36415 COLL VENOUS BLD VENIPUNCTURE: CPT

## 2023-03-02 PROCEDURE — 3008F BODY MASS INDEX DOCD: CPT | Performed by: FAMILY MEDICINE

## 2023-03-02 PROCEDURE — G0438 PPPS, INITIAL VISIT: HCPCS | Performed by: FAMILY MEDICINE

## 2023-03-02 PROCEDURE — 84443 ASSAY THYROID STIM HORMONE: CPT

## 2023-03-02 PROCEDURE — 82306 VITAMIN D 25 HYDROXY: CPT

## 2023-03-02 PROCEDURE — 99396 PREV VISIT EST AGE 40-64: CPT | Performed by: FAMILY MEDICINE

## 2023-03-02 PROCEDURE — 85025 COMPLETE CBC W/AUTO DIFF WBC: CPT

## 2023-03-02 PROCEDURE — 80061 LIPID PANEL: CPT

## 2023-04-06 ENCOUNTER — OFFICE VISIT (OUTPATIENT)
Dept: OBGYN CLINIC | Facility: CLINIC | Age: 58
End: 2023-04-06

## 2023-04-06 VITALS
SYSTOLIC BLOOD PRESSURE: 121 MMHG | WEIGHT: 171.38 LBS | HEART RATE: 79 BPM | HEIGHT: 63 IN | BODY MASS INDEX: 30.37 KG/M2 | DIASTOLIC BLOOD PRESSURE: 79 MMHG

## 2023-04-06 DIAGNOSIS — Z01.419 ENCOUNTER FOR GYNECOLOGICAL EXAMINATION WITHOUT ABNORMAL FINDING: Primary | ICD-10-CM

## 2023-04-06 DIAGNOSIS — Z12.4 SCREENING FOR MALIGNANT NEOPLASM OF CERVIX: ICD-10-CM

## 2023-04-06 PROCEDURE — 99396 PREV VISIT EST AGE 40-64: CPT | Performed by: OBSTETRICS & GYNECOLOGY

## 2023-04-06 PROCEDURE — 3078F DIAST BP <80 MM HG: CPT | Performed by: OBSTETRICS & GYNECOLOGY

## 2023-04-06 PROCEDURE — 3074F SYST BP LT 130 MM HG: CPT | Performed by: OBSTETRICS & GYNECOLOGY

## 2023-04-06 PROCEDURE — 3008F BODY MASS INDEX DOCD: CPT | Performed by: OBSTETRICS & GYNECOLOGY

## 2023-04-07 LAB — HPV I/H RISK 1 DNA SPEC QL NAA+PROBE: NEGATIVE

## 2023-04-17 ENCOUNTER — OFFICE VISIT (OUTPATIENT)
Dept: PODIATRY CLINIC | Facility: CLINIC | Age: 58
End: 2023-04-17

## 2023-04-17 DIAGNOSIS — L60.3 NAIL DYSTROPHY: Primary | ICD-10-CM

## 2023-04-17 DIAGNOSIS — B35.1 ONYCHOMYCOSIS: ICD-10-CM

## 2023-04-17 PROCEDURE — 99213 OFFICE O/P EST LOW 20 MIN: CPT | Performed by: STUDENT IN AN ORGANIZED HEALTH CARE EDUCATION/TRAINING PROGRAM

## 2023-05-22 ENCOUNTER — OFFICE VISIT (OUTPATIENT)
Dept: FAMILY MEDICINE CLINIC | Facility: CLINIC | Age: 58
End: 2023-05-22

## 2023-05-22 VITALS
SYSTOLIC BLOOD PRESSURE: 126 MMHG | BODY MASS INDEX: 30.94 KG/M2 | HEART RATE: 103 BPM | HEIGHT: 63 IN | DIASTOLIC BLOOD PRESSURE: 82 MMHG | WEIGHT: 174.63 LBS

## 2023-05-22 DIAGNOSIS — B35.1 ONYCHOMYCOSIS OF GREAT TOE: ICD-10-CM

## 2023-05-22 DIAGNOSIS — L20.83 INFANTILE ECZEMA: ICD-10-CM

## 2023-05-22 DIAGNOSIS — L30.9 DERMATITIS: Primary | ICD-10-CM

## 2023-05-22 RX ORDER — MOMETASONE FUROATE 1 MG/G
CREAM TOPICAL
Qty: 30 G | Refills: 0 | Status: SHIPPED | OUTPATIENT
Start: 2023-05-22

## 2023-05-22 RX ORDER — MOMETASONE FUROATE 1 MG/G
CREAM TOPICAL
Qty: 30 G | Refills: 0 | Status: SHIPPED | OUTPATIENT
Start: 2023-05-22 | End: 2023-05-22

## 2023-12-29 ENCOUNTER — NURSE TRIAGE (OUTPATIENT)
Dept: FAMILY MEDICINE CLINIC | Facility: CLINIC | Age: 58
End: 2023-12-29

## 2023-12-29 DIAGNOSIS — N64.59 INVERSION OF BOTH NIPPLES: Primary | ICD-10-CM

## 2023-12-29 NOTE — TELEPHONE ENCOUNTER
Kenn Balderrama pt stated that she will like a order for a diagnostic mammogram to be ordered for her. When I asked pt why she stated that last year when she had her mammogram done the tech inform her that she should let her PCP know about her inverted nipples. Pt mammogram was normal last year. Pt stated that she has no pain, no discharge or any other symptom.  Kenn Balderrama please advise if you want to see pt first. Thank you    Reason for Disposition   Nipple is inverted (i.e., points inward)  (Exception: long-term physical characteristic, present for many years)    Protocols used: Breast Symptoms-A-[place]

## 2024-01-18 ENCOUNTER — HOSPITAL ENCOUNTER (OUTPATIENT)
Dept: MAMMOGRAPHY | Facility: HOSPITAL | Age: 59
Discharge: HOME | End: 2024-01-18
Attending: FAMILY MEDICINE
Payer: COMMERCIAL

## 2024-01-18 ENCOUNTER — HOSPITAL ENCOUNTER (OUTPATIENT)
Dept: ULTRASOUND IMAGING | Facility: HOSPITAL | Age: 59
Discharge: HOME | End: 2024-01-18
Attending: FAMILY MEDICINE
Payer: COMMERCIAL

## 2024-01-18 DIAGNOSIS — N64.59 INVERSION OF BOTH NIPPLES: ICD-10-CM

## 2024-01-18 PROCEDURE — 77062 BREAST TOMOSYNTHESIS BI: CPT | Performed by: FAMILY MEDICINE

## 2024-01-18 PROCEDURE — 76642 ULTRASOUND BREAST LIMITED: CPT | Performed by: FAMILY MEDICINE

## 2024-01-18 PROCEDURE — 77066 DX MAMMO INCL CAD BI: CPT | Performed by: FAMILY MEDICINE

## 2024-05-23 ENCOUNTER — OFFICE VISIT (OUTPATIENT)
Dept: FAMILY MEDICINE CLINIC | Facility: CLINIC | Age: 59
End: 2024-05-23

## 2024-05-23 ENCOUNTER — LAB ENCOUNTER (OUTPATIENT)
Dept: LAB | Age: 59
End: 2024-05-23
Attending: FAMILY MEDICINE

## 2024-05-23 VITALS
BODY MASS INDEX: 29.95 KG/M2 | TEMPERATURE: 98 F | WEIGHT: 169 LBS | HEART RATE: 91 BPM | SYSTOLIC BLOOD PRESSURE: 139 MMHG | DIASTOLIC BLOOD PRESSURE: 80 MMHG | HEIGHT: 63 IN

## 2024-05-23 DIAGNOSIS — R73.9 HYPERGLYCEMIA: ICD-10-CM

## 2024-05-23 DIAGNOSIS — Z12.4 SCREENING FOR CERVICAL CANCER: ICD-10-CM

## 2024-05-23 DIAGNOSIS — L57.8 SUN-DAMAGED SKIN: ICD-10-CM

## 2024-05-23 DIAGNOSIS — Z00.00 ADULT GENERAL MEDICAL EXAM: ICD-10-CM

## 2024-05-23 DIAGNOSIS — Z12.83 SKIN CANCER SCREENING: ICD-10-CM

## 2024-05-23 DIAGNOSIS — R05.8 ALLERGIC COUGH: ICD-10-CM

## 2024-05-23 DIAGNOSIS — Z00.00 ADULT GENERAL MEDICAL EXAM: Primary | ICD-10-CM

## 2024-05-23 LAB
ALBUMIN SERPL-MCNC: 4.7 G/DL (ref 3.2–4.8)
ALBUMIN/GLOB SERPL: 1.7 {RATIO} (ref 1–2)
ALP LIVER SERPL-CCNC: 75 U/L
ALT SERPL-CCNC: 27 U/L
ANION GAP SERPL CALC-SCNC: 7 MMOL/L (ref 0–18)
AST SERPL-CCNC: 20 U/L (ref ?–34)
BASOPHILS # BLD AUTO: 0.05 X10(3) UL (ref 0–0.2)
BASOPHILS NFR BLD AUTO: 0.7 %
BILIRUB SERPL-MCNC: 1 MG/DL (ref 0.3–1.2)
BUN BLD-MCNC: 13 MG/DL (ref 9–23)
BUN/CREAT SERPL: 18.6 (ref 10–20)
CALCIUM BLD-MCNC: 10 MG/DL (ref 8.7–10.4)
CHLORIDE SERPL-SCNC: 109 MMOL/L (ref 98–112)
CHOLEST SERPL-MCNC: 238 MG/DL (ref ?–200)
CO2 SERPL-SCNC: 25 MMOL/L (ref 21–32)
CREAT BLD-MCNC: 0.7 MG/DL
DEPRECATED RDW RBC AUTO: 39.9 FL (ref 35.1–46.3)
EGFRCR SERPLBLD CKD-EPI 2021: 100 ML/MIN/1.73M2 (ref 60–?)
EOSINOPHIL # BLD AUTO: 0.13 X10(3) UL (ref 0–0.7)
EOSINOPHIL NFR BLD AUTO: 1.9 %
ERYTHROCYTE [DISTWIDTH] IN BLOOD BY AUTOMATED COUNT: 12 % (ref 11–15)
EST. AVERAGE GLUCOSE BLD GHB EST-MCNC: 131 MG/DL (ref 68–126)
FASTING PATIENT LIPID ANSWER: YES
FASTING STATUS PATIENT QL REPORTED: YES
GLOBULIN PLAS-MCNC: 2.8 G/DL (ref 2–3.5)
GLUCOSE BLD-MCNC: 107 MG/DL (ref 70–99)
HBA1C MFR BLD: 6.2 % (ref ?–5.7)
HCT VFR BLD AUTO: 40.2 %
HDLC SERPL-MCNC: 40 MG/DL (ref 40–59)
HGB BLD-MCNC: 14.1 G/DL
IMM GRANULOCYTES # BLD AUTO: 0.02 X10(3) UL (ref 0–1)
IMM GRANULOCYTES NFR BLD: 0.3 %
LDLC SERPL CALC-MCNC: 164 MG/DL (ref ?–100)
LYMPHOCYTES # BLD AUTO: 2.79 X10(3) UL (ref 1–4)
LYMPHOCYTES NFR BLD AUTO: 41.3 %
MCH RBC QN AUTO: 31.8 PG (ref 26–34)
MCHC RBC AUTO-ENTMCNC: 35.1 G/DL (ref 31–37)
MCV RBC AUTO: 90.5 FL
MONOCYTES # BLD AUTO: 0.48 X10(3) UL (ref 0.1–1)
MONOCYTES NFR BLD AUTO: 7.1 %
NEUTROPHILS # BLD AUTO: 3.28 X10 (3) UL (ref 1.5–7.7)
NEUTROPHILS # BLD AUTO: 3.28 X10(3) UL (ref 1.5–7.7)
NEUTROPHILS NFR BLD AUTO: 48.7 %
NONHDLC SERPL-MCNC: 198 MG/DL (ref ?–130)
OSMOLALITY SERPL CALC.SUM OF ELEC: 293 MOSM/KG (ref 275–295)
PLATELET # BLD AUTO: 326 10(3)UL (ref 150–450)
POTASSIUM SERPL-SCNC: 4.3 MMOL/L (ref 3.5–5.1)
PROT SERPL-MCNC: 7.5 G/DL (ref 5.7–8.2)
RBC # BLD AUTO: 4.44 X10(6)UL
SODIUM SERPL-SCNC: 141 MMOL/L (ref 136–145)
TRIGL SERPL-MCNC: 184 MG/DL (ref 30–149)
TSI SER-ACNC: 0.81 MIU/ML (ref 0.55–4.78)
VIT D+METAB SERPL-MCNC: 30.4 NG/ML (ref 30–100)
VLDLC SERPL CALC-MCNC: 36 MG/DL (ref 0–30)
WBC # BLD AUTO: 6.8 X10(3) UL (ref 4–11)

## 2024-05-23 PROCEDURE — G0438 PPPS, INITIAL VISIT: HCPCS | Performed by: FAMILY MEDICINE

## 2024-05-23 PROCEDURE — 82306 VITAMIN D 25 HYDROXY: CPT

## 2024-05-23 PROCEDURE — 99396 PREV VISIT EST AGE 40-64: CPT | Performed by: FAMILY MEDICINE

## 2024-05-23 PROCEDURE — 3075F SYST BP GE 130 - 139MM HG: CPT | Performed by: FAMILY MEDICINE

## 2024-05-23 PROCEDURE — 84443 ASSAY THYROID STIM HORMONE: CPT

## 2024-05-23 PROCEDURE — 3079F DIAST BP 80-89 MM HG: CPT | Performed by: FAMILY MEDICINE

## 2024-05-23 PROCEDURE — 36415 COLL VENOUS BLD VENIPUNCTURE: CPT

## 2024-05-23 PROCEDURE — 85025 COMPLETE CBC W/AUTO DIFF WBC: CPT

## 2024-05-23 PROCEDURE — 3008F BODY MASS INDEX DOCD: CPT | Performed by: FAMILY MEDICINE

## 2024-05-23 PROCEDURE — 83036 HEMOGLOBIN GLYCOSYLATED A1C: CPT

## 2024-05-23 PROCEDURE — 80053 COMPREHEN METABOLIC PANEL: CPT

## 2024-05-23 PROCEDURE — 80061 LIPID PANEL: CPT

## 2024-05-23 RX ORDER — CAL/D3/MAG11/ZINC/COP/MANG/BOR 600 MG-800
1 TABLET ORAL 2 TIMES DAILY
Qty: 180 TABLET | Refills: 0 | Status: SHIPPED | OUTPATIENT
Start: 2024-05-23

## 2024-05-23 NOTE — PATIENT INSTRUCTIONS
GENERIC ALLEGRA 180 mg    Get over-the-counter Allegra 180 mg but just get the generic which is Fexofenadine 180 mg and take daily for the needed for allergies..  It does not make you tired.                                             BLOOD PRESSURE CUFF    Get a blood pressure cuff that goes on the arm.  Do not get the wrist cuff.  Check your blood pressures on your left arm daily at home.  Do this with your left arm supported by a table, do not have it hanging down at your side.  Make sure you do this before you have any coffee or caffeine.  Go ahead and write these numbers down a piece of paper for me.  After you get about 10 to 14 days worth of numbers send them to me through [employer].  [employer] is a very reputable brand.

## 2024-05-23 NOTE — PROGRESS NOTES
Patient ID: Aurelia Johnson is a 59 year old female.    HPI  Chief Complaint   Patient presents with    Routine Physical     Last physical on 3/2/2023.    She is due for a pap smear as her last one was in April 2023 with Dr. Mak, gynecologist; I provided a referral for her. She doesn't take any vitamin supplements or any medications. She walks 3 miles daily with her dog during the day. Pt is due for a skin cancer screening, and I provided a referral for her. Denies Hx and FHx skin CA. She has a BP monitoring arm cuff at home but doesn't monitor her BP; I discussed this with her.  She does spend quite a bit of time in the sun.    Pt had nasal congestion, rhinorrhea, sneezing, and cough 10 days ago that is now resolving; has a lingering infrequent cough still and feels fatigued. She states her family and her neighbors experience similar symptoms. Denies fever. Pt tested negative for Covid. Pt doesn't take any allergy medications.     Health Maintenance   Topic Date Due    COVID-19 Vaccine (3 - 2023-24 season) 09/01/2023    Annual Depression Screening  01/01/2024    Annual Physical  03/02/2024    Gyne Pelvic Exam  04/06/2024    Influenza Vaccine (Season Ended) 10/01/2024    Mammogram  01/18/2025    Pap Smear  04/06/2026    Colorectal Cancer Screening  08/12/2026    DTaP,Tdap,and Td Vaccines (2 - Td or Tdap) 09/10/2028    Zoster Vaccines  Completed    Pneumococcal Vaccine: Birth to 64yrs  Aged Out       =======================================================    Lab Results   Component Value Date    WBC 5.9 03/02/2023    RBC 4.50 03/02/2023    HGB 14.1 03/02/2023    HCT 41.3 03/02/2023    .0 03/02/2023    MPV 8.5 09/10/2018    MCV 91.8 03/02/2023    MCH 31.3 03/02/2023    MCHC 34.1 03/02/2023    RDW 11.9 03/02/2023    NEPRELIM 2.46 03/02/2023    NEUT 4.9 10/18/2013    LYMPH 2.1 10/18/2013    MON 0.7 10/18/2013    EOS 0.1 10/18/2013    BASO 0.1 10/18/2013    NEPERCENT 41.8 03/02/2023    LYPERCENT 47.0  03/02/2023    MOPERCENT 7.8 03/02/2023    EOPERCENT 2.4 03/02/2023    BAPERCENT 0.8 03/02/2023    NE 2.46 03/02/2023    LYMABS 2.77 03/02/2023    MOABSO 0.46 03/02/2023    EOABSO 0.14 03/02/2023    BAABSO 0.05 03/02/2023       Lab Results   Component Value Date     (H) 03/02/2023    BUN 15 03/02/2023    BUNCREA 20.8 (H) 03/02/2023    CREATSERUM 0.72 03/02/2023    ANIONGAP 4 03/02/2023    GFRNAA 98 09/16/2021    GFRAA 113 09/16/2021    CA 9.2 03/02/2023    OSMOCALC 289 03/02/2023    ALKPHO 71 03/02/2023    AST 14 (L) 03/02/2023    ALT 29 03/02/2023    ALKPHOS 52 10/18/2013    BILT 1.4 03/02/2023    TP 7.5 03/02/2023    ALB 4.1 03/02/2023    GLOBULIN 3.4 03/02/2023    AGRATIO 1.5 10/18/2013     03/02/2023    K 4.2 03/02/2023     03/02/2023    CO2 27.0 03/02/2023       Lab Results   Component Value Date     (H) 03/02/2023    BUN 15 03/02/2023    CREATSERUM 0.72 03/02/2023    BUNCREA 20.8 (H) 03/02/2023    ANIONGAP 4 03/02/2023    GFRAA 113 09/16/2021    GFRNAA 98 09/16/2021    CA 9.2 03/02/2023     03/02/2023    K 4.2 03/02/2023     03/02/2023    CO2 27.0 03/02/2023    OSMOCALC 289 03/02/2023       Lab Results   Component Value Date     (H) 09/16/2021    A1C 6.1 (H) 09/16/2021    A1C 5.9 (H) 12/02/2019     Lab Results   Component Value Date    CHOLEST 233 (H) 03/02/2023    TRIG 131 03/02/2023    HDL 42 03/02/2023     (H) 03/02/2023    VLDL 26 03/02/2023    NONHDLC 191 (H) 03/02/2023    CALCNONHDL 172 (H) 10/18/2013     TSH (mIU/mL)   Date Value   03/02/2023 0.857     TSH (S) (uIU/mL)   Date Value   10/18/2013 0.96     Lab Results   Component Value Date    VITD 30.3 03/02/2023     =======================================================    Wt Readings from Last 6 Encounters:   05/23/24 169 lb   05/22/23 174 lb 9.6 oz   04/06/23 171 lb 6.4 oz   03/02/23 168 lb   01/26/23 168 lb 12.8 oz   01/17/23 167 lb               BMI Readings from Last 6 Encounters:   05/23/24 29.94  kg/m²   05/22/23 30.93 kg/m²   04/06/23 30.36 kg/m²   03/02/23 29.76 kg/m²   01/26/23 29.90 kg/m²   01/17/23 29.58 kg/m²       BP Readings from Last 6 Encounters:   05/23/24 139/80   05/22/23 126/82   04/06/23 121/79   03/02/23 120/80   01/26/23 109/72   01/17/23 124/78         Review of Systems   Constitutional:  Positive for fatigue. Negative for fever.   Respiratory:  Positive for cough. Negative for shortness of breath.    Cardiovascular:  Negative for chest pain.         Past Medical History:    Colon polyp       Past Surgical History:   Procedure Laterality Date    Cholecystectomy      Other      Baker cyst excision left leg as child       Social History     Socioeconomic History    Marital status:      Spouse name: Not on file    Number of children: Not on file    Years of education: Not on file    Highest education level: Not on file   Occupational History    Not on file   Tobacco Use    Smoking status: Never    Smokeless tobacco: Never   Vaping Use    Vaping status: Never Used   Substance and Sexual Activity    Alcohol use: Yes     Comment: Socially    Drug use: No    Sexual activity: Yes   Other Topics Concern     Service Not Asked    Blood Transfusions Not Asked    Caffeine Concern Yes     Comment: Coffee    Occupational Exposure Not Asked    Hobby Hazards Not Asked    Sleep Concern Not Asked    Stress Concern Not Asked    Weight Concern Not Asked    Special Diet Not Asked    Back Care Not Asked    Exercise Not Asked    Bike Helmet Not Asked    Seat Belt Not Asked    Self-Exams Not Asked    Grew up on a farm Not Asked    History of tanning Not Asked    Outdoor occupation Not Asked    Breast feeding Not Asked    Reaction to local anesthetic No   Social History Narrative    Not on file     Social Determinants of Health     Financial Resource Strain: Not on file   Food Insecurity: Not on file   Transportation Needs: Not on file   Physical Activity: Not on file   Stress: Not on file   Social  Connections: Not on file   Housing Stability: Not on file          No current outpatient medications on file.     Allergies:No Known Allergies   PHYSICAL EXAM:   Physical Exam      Physical Exam   Constitutional: . She appears well-developed and well-nourished. No distress.   Head: Normocephalic.   Right Ear: Tympanic membrane and ear canal normal.   Left Ear: Tympanic membrane and ear canal normal.   Nose: No rhinorrhea. Pale, swollen nasal tissue bilaterally.   Mouth/Throat: Oropharynx is clear and moist and mucous membranes are normal.   Eyes: Conjunctivae and EOM are normal. Pupils are equal, round, and reactive to light.   Neck: Normal range of motion. Neck supple. No thyromegaly present.   Cardiovascular: Normal rate, regular rhythm and no murmur heard.   Pulmonary/Chest: Effort normal and breath sounds normal. No respiratory distress.  Not winded when she speaks.  Abdominal: Soft. Bowel sounds are normal. There is no hepatosplenomegaly. There is no tenderness.   Lymphadenopathy: She has no cervical adenopathy.   Neurological: She is alert and oriented to person, place, and time. She has normal reflexes. No cranial nerve deficit.   Skin: Skin is warm and dry. No rash noted.  Multiple freckles and tan.  Psychiatric: She has a normal mood and affect.  Lower legs: No edema of the legs bilaterally.    Vitals reviewed.    Blood pressure 139/82, pulse 91, temperature 97.8 °F (36.6 °C), height 5' 3\" (1.6 m), weight 169 lb, last menstrual period 01/30/2020, not currently breastfeeding.    Vitals:    05/23/24 0929 05/23/24 0943   BP: 139/82 139/80   Pulse: 91    Temp: 97.8 °F (36.6 °C)    Weight: 169 lb    Height: 5' 3\" (1.6 m)             ASSESSMENT/PLAN:     Diagnoses and all orders for this visit:    Adult general medical exam  -     CBC With Differential With Platelet; Future  -     Comp Metabolic Panel (14); Future  -     Lipid Panel; Future  -     Assay, Thyroid Stim Hormone; Future  -     Calcium Carbonate-Vit  D-Min (CALTRATE 600+D PLUS MINERALS) 600-800 MG-UNIT Oral Tab; Take 1 tablet by mouth in the morning and 1 tablet before bedtime. (Calcium and vitamin D).  -     Vitamin D [E]; Future    Screening for cervical cancer  -     OBG Referral - In Network    Hyperglycemia  -     Hemoglobin A1C; Future    Allergic cough  See patient instructions.  She does not like taking medications on a regular basis so I told her fexofenadine could be quite helpful.  She does not look ill and her sinuses do not look infected.  She is already doing much better.  She has some postnasal drip which causes her to have the intermittent cough.  Skin cancer screening  -     DERM - INTERNAL    Sun-damaged skin  -     DERM - INTERNAL        Referrals (if applicable)  Orders Placed This Encounter   Procedures    OBG Referral - In Network     Referral Priority:   Routine     Referral Type:   OFFICE VISIT     Referred to Provider:   Valentina Mak MD     Requested Specialty:   OBSTETRICS & GYNECOLOGY     Number of Visits Requested:   3    DERM - INTERNAL     SKIN DOCTOR    Call [phone].     Referral Priority:   Routine     Referral Type:   OFFICE VISIT     Referred to Provider:   Nirmala Guerra MD     Requested Specialty:   DERMATOLOGY     Number of Visits Requested:   3       Follow up if symptoms persist.  Take medicine (if given) as prescribed.  Approach to treatment discussed and patient/family member understands and agrees to plan.     No follow-ups on file.    Patient Instructions                                                           GENERIC ALLEGRA 180 mg    Get over-the-counter Allegra 180 mg but just get the generic which is Fexofenadine 180 mg and take daily for the needed for allergies..  It does not make you tired.                                             BLOOD PRESSURE CUFF    Get a blood pressure cuff that goes on the arm.  Do not get the wrist cuff.  Check your blood pressures on your left arm daily at home.  Do this  with your left arm supported by a table, do not have it hanging down at your side.  Make sure you do this before you have any coffee or caffeine.  Go ahead and write these numbers down a piece of paper for me.  After you get about 10 to 14 days worth of numbers send them to me through [employer].  [employer] is a very reputable brand.           Peyman Gaytan    5/23/2024    By signing my name below, I, Peyman Gaytan,  attest that this documentation has been prepared under the direction and in the presence of Nasir Shah DO.   Electronically Signed: Peyman Gaytan, 5/23/2024, 9:32 AM.      I, Nasir Shah DO,  personally performed the services described in this documentation. All medical record entries made by the scribe were at my direction and in my presence.  I have reviewed the chart and discharge instructions (if applicable) and agree that the record reflects my personal performance and is accurate and complete.  Nasir Shah DO, 5/23/2024, 10:08 AM

## 2025-03-21 ENCOUNTER — TELEPHONE (OUTPATIENT)
Dept: FAMILY MEDICINE CLINIC | Facility: CLINIC | Age: 60
End: 2025-03-21

## 2025-03-21 DIAGNOSIS — Z12.31 SCREENING MAMMOGRAM, ENCOUNTER FOR: Primary | ICD-10-CM

## 2025-05-16 ENCOUNTER — HOSPITAL ENCOUNTER (OUTPATIENT)
Dept: MAMMOGRAPHY | Age: 60
Discharge: HOME | End: 2025-05-16
Attending: FAMILY MEDICINE
Payer: COMMERCIAL

## 2025-05-16 DIAGNOSIS — Z12.31 SCREENING MAMMOGRAM, ENCOUNTER FOR: ICD-10-CM

## 2025-05-16 PROCEDURE — 77063 BREAST TOMOSYNTHESIS BI: CPT | Performed by: FAMILY MEDICINE

## 2025-05-16 PROCEDURE — 77067 SCR MAMMO BI INCL CAD: CPT | Performed by: FAMILY MEDICINE

## 2025-05-29 ENCOUNTER — LAB ENCOUNTER (OUTPATIENT)
Dept: LAB | Age: 60
End: 2025-05-29
Attending: FAMILY MEDICINE
Payer: COMMERCIAL

## 2025-05-29 ENCOUNTER — OFFICE VISIT (OUTPATIENT)
Dept: FAMILY MEDICINE CLINIC | Facility: CLINIC | Age: 60
End: 2025-05-29

## 2025-05-29 VITALS
TEMPERATURE: 98 F | SYSTOLIC BLOOD PRESSURE: 110 MMHG | WEIGHT: 170.19 LBS | DIASTOLIC BLOOD PRESSURE: 76 MMHG | BODY MASS INDEX: 30.16 KG/M2 | HEART RATE: 106 BPM | HEIGHT: 63 IN

## 2025-05-29 DIAGNOSIS — E55.9 VITAMIN D DEFICIENCY: ICD-10-CM

## 2025-05-29 DIAGNOSIS — Z00.00 ADULT GENERAL MEDICAL EXAM: Primary | ICD-10-CM

## 2025-05-29 DIAGNOSIS — Z00.00 ADULT GENERAL MEDICAL EXAM: ICD-10-CM

## 2025-05-29 DIAGNOSIS — Z12.4 SCREENING FOR CERVICAL CANCER: ICD-10-CM

## 2025-05-29 DIAGNOSIS — L57.8 SUN-DAMAGED SKIN: ICD-10-CM

## 2025-05-29 DIAGNOSIS — Z12.83 SKIN CANCER SCREENING: ICD-10-CM

## 2025-05-29 LAB
ALBUMIN SERPL-MCNC: 4.8 G/DL (ref 3.2–4.8)
ALBUMIN/GLOB SERPL: 1.8 {RATIO} (ref 1–2)
ALP LIVER SERPL-CCNC: 68 U/L (ref 46–118)
ALT SERPL-CCNC: 34 U/L (ref 10–49)
ANION GAP SERPL CALC-SCNC: 7 MMOL/L (ref 0–18)
AST SERPL-CCNC: 23 U/L (ref ?–34)
BASOPHILS # BLD AUTO: 0.08 X10(3) UL (ref 0–0.2)
BASOPHILS NFR BLD AUTO: 1.1 %
BILIRUB SERPL-MCNC: 1.4 MG/DL (ref 0.2–1.1)
BUN BLD-MCNC: 12 MG/DL (ref 9–23)
BUN/CREAT SERPL: 15.8 (ref 10–20)
CALCIUM BLD-MCNC: 9.5 MG/DL (ref 8.7–10.4)
CHLORIDE SERPL-SCNC: 106 MMOL/L (ref 98–112)
CHOLEST SERPL-MCNC: 257 MG/DL (ref ?–200)
CO2 SERPL-SCNC: 26 MMOL/L (ref 21–32)
CREAT BLD-MCNC: 0.76 MG/DL (ref 0.55–1.02)
DEPRECATED RDW RBC AUTO: 40.5 FL (ref 35.1–46.3)
EGFRCR SERPLBLD CKD-EPI 2021: 90 ML/MIN/1.73M2 (ref 60–?)
EOSINOPHIL # BLD AUTO: 0.27 X10(3) UL (ref 0–0.7)
EOSINOPHIL NFR BLD AUTO: 3.9 %
ERYTHROCYTE [DISTWIDTH] IN BLOOD BY AUTOMATED COUNT: 12 % (ref 11–15)
FASTING PATIENT LIPID ANSWER: YES
FASTING STATUS PATIENT QL REPORTED: YES
GLOBULIN PLAS-MCNC: 2.6 G/DL (ref 2–3.5)
GLUCOSE BLD-MCNC: 112 MG/DL (ref 70–99)
HCT VFR BLD AUTO: 42.9 % (ref 35–48)
HDLC SERPL-MCNC: 39 MG/DL (ref 40–59)
HGB BLD-MCNC: 14.5 G/DL (ref 12–16)
IMM GRANULOCYTES # BLD AUTO: 0.01 X10(3) UL (ref 0–1)
IMM GRANULOCYTES NFR BLD: 0.1 %
LDLC SERPL CALC-MCNC: 181 MG/DL (ref ?–100)
LYMPHOCYTES # BLD AUTO: 2.59 X10(3) UL (ref 1–4)
LYMPHOCYTES NFR BLD AUTO: 36.9 %
MCH RBC QN AUTO: 30.9 PG (ref 26–34)
MCHC RBC AUTO-ENTMCNC: 33.8 G/DL (ref 31–37)
MCV RBC AUTO: 91.3 FL (ref 80–100)
MONOCYTES # BLD AUTO: 0.53 X10(3) UL (ref 0.1–1)
MONOCYTES NFR BLD AUTO: 7.6 %
NEUTROPHILS # BLD AUTO: 3.53 X10 (3) UL (ref 1.5–7.7)
NEUTROPHILS # BLD AUTO: 3.53 X10(3) UL (ref 1.5–7.7)
NEUTROPHILS NFR BLD AUTO: 50.4 %
NONHDLC SERPL-MCNC: 218 MG/DL (ref ?–130)
OSMOLALITY SERPL CALC.SUM OF ELEC: 289 MOSM/KG (ref 275–295)
PLATELET # BLD AUTO: 284 10(3)UL (ref 150–450)
POTASSIUM SERPL-SCNC: 4.4 MMOL/L (ref 3.5–5.1)
PROT SERPL-MCNC: 7.4 G/DL (ref 5.7–8.2)
RBC # BLD AUTO: 4.7 X10(6)UL (ref 3.8–5.3)
SODIUM SERPL-SCNC: 139 MMOL/L (ref 136–145)
TRIGL SERPL-MCNC: 198 MG/DL (ref 30–149)
TSI SER-ACNC: 0.81 UIU/ML (ref 0.55–4.78)
VIT D+METAB SERPL-MCNC: 33.2 NG/ML (ref 30–100)
VLDLC SERPL CALC-MCNC: 41 MG/DL (ref 0–30)
WBC # BLD AUTO: 7 X10(3) UL (ref 4–11)

## 2025-05-29 PROCEDURE — 80061 LIPID PANEL: CPT

## 2025-05-29 PROCEDURE — 83036 HEMOGLOBIN GLYCOSYLATED A1C: CPT | Performed by: FAMILY MEDICINE

## 2025-05-29 PROCEDURE — 36415 COLL VENOUS BLD VENIPUNCTURE: CPT

## 2025-05-29 PROCEDURE — 84443 ASSAY THYROID STIM HORMONE: CPT

## 2025-05-29 PROCEDURE — 82306 VITAMIN D 25 HYDROXY: CPT

## 2025-05-29 PROCEDURE — 80053 COMPREHEN METABOLIC PANEL: CPT

## 2025-05-29 PROCEDURE — 85025 COMPLETE CBC W/AUTO DIFF WBC: CPT

## 2025-05-29 NOTE — PROGRESS NOTES
Patient ID: Aurelia Johnson is a 60 year old female.         The following individual(s) verbally consented to be recorded using ambient AI listening technology and understand that they can each withdraw their consent to this listening technology at any point by asking the clinician to turn off or pause the recording:    Patient name: Aurelia Johnson  Additional names:             HPI  Chief Complaint   Patient presents with    Routine Physical       History of Present Illness  Ms. Aurelia Johnson is a 60 year old female who presents for an annual physical exam.    She is due for a gynecological exam and has recently completed a mammogram. She plans to see specialists for her gynecological needs and skin cancer screening, as both she and her  regularly consult a dermatologist.    She has a history of low vitamin D levels, with a previous measurement of 22.19 ng/mL from a year ago. She does not currently take any supplements, including calcium or vitamin D. There is no history of diabetes, kidney, or liver issues. Her last complete blood count did not show anemia, and her cholesterol levels were not reported as high.    She turned 60 in April and celebrated her birthday last month. She is concerned about osteoporosis and fractures, especially given her age and her in-laws' experiences with falls.    She does not smoke and experiences seasonal allergies, particularly sneezing and itchy eyes. She prefers not to take medication for these symptoms as they tend to improve over time. She has tried medications in the past but found them to be sedating, even those labeled as non-drowsy.          Health Maintenance   Topic Date Due    Pneumococcal Vaccine: 50+ Years (1 of 1 - PCV) Never done    Gyne Pelvic Exam  04/06/2024    COVID-19 Vaccine (3 - 2024-25 season) 09/01/2024    Annual Depression Screening  01/01/2025    Annual Physical  05/23/2025    Influenza Vaccine (Season Ended) 10/01/2025    Pap Smear   04/06/2026    Mammogram  05/16/2026    Colorectal Cancer Screening  08/12/2026    DTaP,Tdap,and Td Vaccines (2 - Td or Tdap) 09/10/2028    Zoster Vaccines  Completed    Meningococcal B Vaccine  Aged Out       Results  LABS  Vitamin D: 22.19 ng/mL (08/06/2024)    =======================================================    Lab Results   Component Value Date    WBC 6.8 05/23/2024    RBC 4.44 05/23/2024    HGB 14.1 05/23/2024    HCT 40.2 05/23/2024    .0 05/23/2024    MPV 8.5 09/10/2018    MCV 90.5 05/23/2024    MCH 31.8 05/23/2024    MCHC 35.1 05/23/2024    RDW 12.0 05/23/2024    NEPRELIM 3.28 05/23/2024    NEUT 4.9 10/18/2013    LYMPH 2.1 10/18/2013    MON 0.7 10/18/2013    EOS 0.1 10/18/2013    BASO 0.1 10/18/2013    NEPERCENT 48.7 05/23/2024    LYPERCENT 41.3 05/23/2024    MOPERCENT 7.1 05/23/2024    EOPERCENT 1.9 05/23/2024    BAPERCENT 0.7 05/23/2024    NE 3.28 05/23/2024    LYMABS 2.79 05/23/2024    MOABSO 0.48 05/23/2024    EOABSO 0.13 05/23/2024    BAABSO 0.05 05/23/2024       Lab Results   Component Value Date     (H) 05/23/2024    BUN 13 05/23/2024    BUNCREA 18.6 05/23/2024    CREATSERUM 0.70 05/23/2024    ANIONGAP 7 05/23/2024    GFRNAA 98 09/16/2021    GFRAA 113 09/16/2021    CA 10.0 05/23/2024    OSMOCALC 293 05/23/2024    ALKPHO 75 05/23/2024    AST 20 05/23/2024    ALT 27 05/23/2024    ALKPHOS 52 10/18/2013    BILT 1.0 05/23/2024    TP 7.5 05/23/2024    ALB 4.7 05/23/2024    GLOBULIN 2.8 05/23/2024    AGRATIO 1.5 10/18/2013     05/23/2024    K 4.3 05/23/2024     05/23/2024    CO2 25.0 05/23/2024       Lab Results   Component Value Date     (H) 05/23/2024    BUN 13 05/23/2024    CREATSERUM 0.70 05/23/2024    BUNCREA 18.6 05/23/2024    ANIONGAP 7 05/23/2024    GFRAA 113 09/16/2021    GFRNAA 98 09/16/2021    CA 10.0 05/23/2024     05/23/2024    K 4.3 05/23/2024     05/23/2024    CO2 25.0 05/23/2024    OSMOCALC 293 05/23/2024       No results found for: \"COLORUR\",  \"CLARITY\", \"SPECGRAVITY\", \"GLUUR\", \"BILUR\", \"KETUR\", \"BLOODURINE\", \"PHURINE\", \"PROUR\", \"UROBILINOGEN\", \"NITRITE\", \"LEUUR\", \"ASCACIDURINE\", \"NMIC\", \"WBCUR\", \"RBCUR\", \"EPIUR\", \"HYALCASTURN\", \"BACUR\", \"CAOXUR\", \"HYLUR\", \"MICCOM\"  Lab Results   Component Value Date     (H) 05/23/2024    A1C 6.2 (H) 05/23/2024    A1C 6.1 (H) 09/16/2021    A1C 5.9 (H) 12/02/2019       No results found for: \"MALBP\", \"CREUR\", \"CREAURINE\", \"MIALBURINE\", \"MCRRATIOUR\", \"MALBCRECALC\", \"MICROALBUMIN\", \"CREAUR\", \"MALBCREACALC\"    Lab Results   Component Value Date    CHOLEST 238 (H) 05/23/2024    TRIG 184 (H) 05/23/2024    HDL 40 05/23/2024     (H) 05/23/2024    VLDL 36 (H) 05/23/2024    NONHDLC 198 (H) 05/23/2024    CALCNONHDL 172 (H) 10/18/2013     TSH (mIU/mL)   Date Value   05/23/2024 0.814     TSH (S) (uIU/mL)   Date Value   10/18/2013 0.96       No results found for: \"B12\", \"VITB12\"    No results found for: \"IRON\", \"IRONTOT\"    No results found for: \"SAT\"    No results found for: \"IRON\", \"IRONTOT\", \"TIBC\", \"IRONSAT\", \"TRANSFERRIN\", \"TIBCP\", \"IRONBIND\", \"SAT\", \"SATUR\"    No results found for: \"JOLLY\"    Lab Results   Component Value Date    VITD 30.4 05/23/2024     No results found for: \"PSA\", \"QPSA\", \"TOTPSASCREEN\"    =======================================================    Wt Readings from Last 6 Encounters:   05/29/25 170 lb 3.2 oz (77.2 kg)   05/23/24 169 lb (76.7 kg)   05/22/23 174 lb 9.6 oz (79.2 kg)   04/06/23 171 lb 6.4 oz (77.7 kg)   03/02/23 168 lb (76.2 kg)   01/26/23 168 lb 12.8 oz (76.6 kg)               BMI Readings from Last 6 Encounters:   05/29/25 30.15 kg/m²   05/23/24 29.94 kg/m²   05/22/23 30.93 kg/m²   04/06/23 30.36 kg/m²   03/02/23 29.76 kg/m²   01/26/23 29.90 kg/m²       BP Readings from Last 6 Encounters:   05/29/25 110/76   05/23/24 139/80   05/22/23 126/82   04/06/23 121/79   03/02/23 120/80   01/26/23 109/72         Review of Systems  No exertional cardiac chest pain or shortness of breath unless  stated in HPI which would take precedence.  See HPI for further review of systems.    Past Medical History:    Colon polyp       Past Surgical History:   Procedure Laterality Date    Cholecystectomy      Other      Baker cyst excision left leg as child       Social History     Socioeconomic History    Marital status:      Spouse name: Not on file    Number of children: Not on file    Years of education: Not on file    Highest education level: Not on file   Occupational History    Not on file   Tobacco Use    Smoking status: Never    Smokeless tobacco: Never   Vaping Use    Vaping status: Never Used   Substance and Sexual Activity    Alcohol use: Yes     Comment: Socially    Drug use: No    Sexual activity: Yes   Other Topics Concern     Service Not Asked    Blood Transfusions Not Asked    Caffeine Concern Yes     Comment: Coffee    Occupational Exposure Not Asked    Hobby Hazards Not Asked    Sleep Concern Not Asked    Stress Concern Not Asked    Weight Concern Not Asked    Special Diet Not Asked    Back Care Not Asked    Exercise Not Asked    Bike Helmet Not Asked    Seat Belt Not Asked    Self-Exams Not Asked    Grew up on a farm Not Asked    History of tanning Not Asked    Outdoor occupation Not Asked    Breast feeding Not Asked    Reaction to local anesthetic No   Social History Narrative    Not on file     Social Drivers of Health     Food Insecurity: Not on file   Transportation Needs: Not on file   Stress: Not on file   Housing Stability: Not on file          Current Outpatient Medications   Medication Sig Dispense Refill    Calcium Carbonate-Vit D-Min (CALTRATE 600+D PLUS MINERALS) 600-800 MG-UNIT Oral Tab Take 1 tablet by mouth in the morning and 1 tablet before bedtime. (Calcium and vitamin D). 180 tablet 0     Allergies:  Allergies   Allergen Reactions    Seasonal OTHER (SEE COMMENTS)     Sneezing      PHYSICAL EXAM:   Physical Exam  Physical Exam   Constitutional: . She appears  well-developed and well-nourished. No distress.   HENT:   Head: Normocephalic.   Right Ear: Tympanic membrane and ear canal normal.   Left Ear: Tympanic membrane and ear canal normal.   Mouth/Throat: Oropharynx is clear and moist and mucous membranes are normal.   Eyes: Conjunctivae and EOM are normal. Pupils are equal, round, and reactive to light.   Neck: Normal range of motion. Neck supple. No thyromegaly present.   Cardiovascular: Normal rate, regular rhythm and no murmur heard.  Pulmonary/Chest: Effort normal and breath sounds normal. No respiratory distress.   Abdominal: Soft. Bowel sounds are normal. There is no hepatosplenomegaly. There is no tenderness.   Lymphadenopathy:     She has no  cervical adenopathy.   Neurological: She is alert and oriented to person, place, and time . She has normal reflexes. No cranial nerve deficit.   Skin: Skin is warm and dry. No rash noted.  Does have tanned skin but no specific lesion that is worrisome  Psychiatric: She has a normal mood and affect   Lower legs: No edema of the legs bilaterally.    Physical Exam  VITALS: BP- 110/76  HEENT: Sinuses not swollen. No tonsillar enlargement.  CHEST: Clear to auscultation bilaterally.  CARDIOVASCULAR: Normal heart sounds.  ABDOMEN: Normal bowel sounds.  EXTREMITIES: Pedal pulses 2+; no significant edema.    Vitals reviewed.  Vitals:    05/29/25 0931   BP: 110/76   BP Location: Left arm   Patient Position: Sitting   Cuff Size: large   Pulse: 106   Temp: 97.6 °F (36.4 °C)   TempSrc: Tympanic   Weight: 170 lb 3.2 oz (77.2 kg)   Height: 5' 3\" (1.6 m)       Blood pressure 110/76, pulse 106, temperature 97.6 °F (36.4 °C), temperature source Tympanic, height 5' 3\" (1.6 m), weight 170 lb 3.2 oz (77.2 kg), last menstrual period 01/30/2020, not currently breastfeeding.             ASSESSMENT/PLAN:     Diagnoses and all orders for this visit:    Adult general medical exam  -     CBC With Differential With Platelet; Future  -     Comp  Metabolic Panel (14); Future  -     Lipid Panel; Future  -     Assay, Thyroid Stim Hormone; Future    Screening for cervical cancer  -     OBG - INTERNAL    Skin cancer screening  -     DERM - INTERNAL    Sun-damaged skin  -     DERM - INTERNAL    Vitamin D deficiency  -     Vitamin D; Future        Referrals (if applicable)  Orders Placed This Encounter   Procedures    OBG - INTERNAL     ALSO WORKS AT [facility]     Referral Priority:   Routine     Referral Type:   OFFICE VISIT     Referred to Provider:   Valentina Mak MD     Requested Specialty:   OBSTETRICS & GYNECOLOGY     Number of Visits Requested:   3    DERM - INTERNAL     SKIN DOCTOR    Call [phone].     Referral Priority:   Routine     Referral Type:   OFFICE VISIT     Referred to Provider:   Nirmala Guerra MD     Requested Specialty:   DERMATOLOGY     Number of Visits Requested:   3         Follow up if symptoms persist.  Take medicine (if given) as prescribed.  Approach to treatment discussed and patient/family member understands and agrees to plan.     No follow-ups on file.      Assessment & Plan  Vitamin D deficiency  Vitamin D level is low at 22.19 ng/mL as of August 6, 2024, increasing the risk of osteoporosis and fractures with age.  - Order vitamin D level test  - Initiate Caltrate D, one tablet twice daily    Allergic rhinitis  Experiencing sneezing and itchy eyes, likely due to seasonal allergies. Prefers to avoid medication due to side effects such as drowsiness.  - Consider over-the-counter options like Claritin, Flonase, or eye drops if symptoms worsen    Nasir Shah, DO  5/29/2025

## 2025-06-02 ENCOUNTER — TELEPHONE (OUTPATIENT)
Dept: FAMILY MEDICINE CLINIC | Facility: CLINIC | Age: 60
End: 2025-06-02

## 2025-06-02 NOTE — TELEPHONE ENCOUNTER
Patient calling back for results  Notified of below and states understanding  3 identifiers used to confirm calling     Patient contacted (name and date of birth verified). Provider's results and recommendations reviewed with patient. Patient verbalizes understanding of the information, agrees with plan of care and offers no further questions at this time.     Blessing Ingram  6/2/2025 10:15 AM CDT Back to Top      1st attempt-LVMTCB    Nasir Shah DO  6/1/2025  5:09 PM CDT       Hemoglobin A1c was minimally elevated at 6.1 but better than it was 1 year ago.  No need for diabetic medications.  Continue to stay active to avoid diabetes.

## (undated) DIAGNOSIS — M25.569 ACUTE KNEE PAIN, UNSPECIFIED LATERALITY: Primary | ICD-10-CM

## (undated) NOTE — LETTER
Magy Doe, Do  [address]   [address]  [address] [facility],  [address]       10/29/21        Patient: Gypsy Araujo   YOB: 1965   Date of Visit: 10/28/2021       Dear  Dr. Zaid Ramirez,      Thank you for referring Gypsy Araujo to my pr

## (undated) NOTE — Clinical Note
Hello I would appreciate your help with a coding question. I excised this intraoral lesion which measured roughly 1 cm in length to excise the whole lesion with a simple closure. Would you mind adding the CPT code to this visit.   Thank you

## (undated) NOTE — LETTER
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 This is a benign (not cancerous) growth, and requires no further treatment. Please follow up with our office in 1 year or sooner if any changes occur. If you have any questions please contact our office.                 Yours Truly,    Cindy Chavez MD  [address] [place] [address]  [address] [place] [id number] [phone]

## (undated) NOTE — LETTER
AUTHORIZATION FOR SURGICAL OPERATION OR OTHER PROCEDURE    1. I hereby authorize Dr. Martha Xie, and [facility] [facility] staff assigned to my case to perform the following operation and/or procedure at the [facility]:        _______________________________________________________________________________________________      2. My physician has explained the nature and purpose of the operation or other procedure, possible alternative methods of treatment, the risks involved, and the possibility of complication to me. I acknowledge that no guarantee has been made as to the result that may be obtained. 3.  I recognize that, during the course of this operation, or other procedure, unforseen conditions may necessitate additional or different procedure than those listed above. I, therefore, further authorize and request that the above named physician, his/her physician assistants or designees perform such procedures as are, in his/her professional opinion, necessary and desirable. 4.  Any tissue or organs removed in the operation or other procedure may be disposed of by and at the discretion of the [facility] and [facility]. 5.  I understand that in the event of a medical emergency, I will be transported by local paramedics to [facility] or other hospital emergency department. 6.  I certify that I have read and fully understand the above consent to operation and/or other procedure. 7.  I acknowledge that my physician has explained sedation/analgesia administration to me including the risks and benefits. I consent to the administration of sedation/analgesia as may be necessary or desirable in the judgement of my physician. Witness signature: ___________________________________________________ Date:  ______/______/_____                    Time:  ________ A. M.  P.M.        Patient Name:  ______________________________________________________  (please print)      Patient signature:  ___________________________________________________             Relationship to Patient:           []  Parent    Responsible person                          []  Spouse  In case of minor or                    [] Other  _____________   Incompetent name:  __________________________________________________                               (please print)      _____________      Responsible person  In case of minor or  Incompetent signature:  _______________________________________________    Statement of Physician  My signature below affirms that prior to the time of the procedure, I have explained to the patient and/or his/her guardian, the risks and benefits involved in the proposed treatment and any reasonable alternative to the proposed treatment. I have also explained the risks and benefits involved in the refusal of the proposed treatment and have answered the patient's questions.                         Date:  ______/______/_______  Provider                      Signature:  __________________________________________________________       Time:  ___________ A.M    P.M.

## (undated) NOTE — MR AVS SNAPSHOT
After Visit Summary   2/19/2020    Justyn Vann    MRN: [id number]           Visit Information     Date & Time  2/19/2020 11:20 AM Provider  Dakota Nickerson MD [address] [place] [address] 86, [facility] Dept.  Phone  [phone] JOSEPH using your mobile device or computer   using [address] with a [facility] Physician or JOSEPH online. The physician will respond and provide   a treatment plan within a few hours.    ONLINE VISIT  Primary Care Providers  Treatment for mild

## (undated) NOTE — LETTER
AUTHORIZATION FOR SURGICAL OPERATION OR OTHER PROCEDURE    1.  I hereby authorize Dr. Cordelia Lanes, and [facility] staff assigned to my case to perform the following operation and/or procedure at the [facility]:    ____________________________ ________ A. M.  P.M.        Patient Name:  ______________________________________________________  (please print)      Patient signature:  ___________________________________________________             Relationship to Patient:           []  Parent    Respo